# Patient Record
Sex: MALE | Race: WHITE | NOT HISPANIC OR LATINO | Employment: FULL TIME | URBAN - METROPOLITAN AREA
[De-identification: names, ages, dates, MRNs, and addresses within clinical notes are randomized per-mention and may not be internally consistent; named-entity substitution may affect disease eponyms.]

---

## 2017-04-13 ENCOUNTER — TRANSFERRED RECORDS (OUTPATIENT)
Dept: HEALTH INFORMATION MANAGEMENT | Facility: CLINIC | Age: 47
End: 2017-04-13

## 2017-04-18 DIAGNOSIS — G44.1 VASCULAR HEADACHE: ICD-10-CM

## 2017-04-19 RX ORDER — RIZATRIPTAN BENZOATE 10 MG/1
TABLET ORAL
Qty: 30 TABLET | Refills: 0 | Status: SHIPPED | OUTPATIENT
Start: 2017-04-19 | End: 2017-05-18

## 2017-04-19 NOTE — TELEPHONE ENCOUNTER
:;  Please notify patient that he is due soon for an office visit with Dr. Gerald Mckeon.  Last physical 5/11/16.

## 2017-05-11 DIAGNOSIS — E78.5 HYPERLIPIDEMIA LDL GOAL <130: ICD-10-CM

## 2017-05-11 LAB
CHOLEST SERPL-MCNC: 226 MG/DL
HDLC SERPL-MCNC: 44 MG/DL
LDLC SERPL CALC-MCNC: 158 MG/DL
NONHDLC SERPL-MCNC: 182 MG/DL
TRIGL SERPL-MCNC: 121 MG/DL

## 2017-05-11 PROCEDURE — 80061 LIPID PANEL: CPT | Performed by: FAMILY MEDICINE

## 2017-05-11 PROCEDURE — 36415 COLL VENOUS BLD VENIPUNCTURE: CPT | Performed by: FAMILY MEDICINE

## 2017-05-18 ENCOUNTER — OFFICE VISIT (OUTPATIENT)
Dept: FAMILY MEDICINE | Facility: CLINIC | Age: 47
End: 2017-05-18
Payer: COMMERCIAL

## 2017-05-18 ENCOUNTER — RADIANT APPOINTMENT (OUTPATIENT)
Dept: GENERAL RADIOLOGY | Facility: CLINIC | Age: 47
End: 2017-05-18
Attending: FAMILY MEDICINE
Payer: COMMERCIAL

## 2017-05-18 VITALS
SYSTOLIC BLOOD PRESSURE: 119 MMHG | WEIGHT: 214.4 LBS | HEART RATE: 66 BPM | BODY MASS INDEX: 31.66 KG/M2 | TEMPERATURE: 98 F | DIASTOLIC BLOOD PRESSURE: 86 MMHG

## 2017-05-18 DIAGNOSIS — M25.571 CHRONIC PAIN OF RIGHT ANKLE: ICD-10-CM

## 2017-05-18 DIAGNOSIS — Z00.00 ROUTINE GENERAL MEDICAL EXAMINATION AT A HEALTH CARE FACILITY: Primary | ICD-10-CM

## 2017-05-18 DIAGNOSIS — G44.1 VASCULAR HEADACHE: ICD-10-CM

## 2017-05-18 DIAGNOSIS — G89.29 CHRONIC PAIN OF RIGHT ANKLE: ICD-10-CM

## 2017-05-18 PROCEDURE — 73610 X-RAY EXAM OF ANKLE: CPT | Mod: RT

## 2017-05-18 PROCEDURE — 99396 PREV VISIT EST AGE 40-64: CPT | Performed by: FAMILY MEDICINE

## 2017-05-18 RX ORDER — RIZATRIPTAN BENZOATE 10 MG/1
TABLET ORAL
Qty: 30 TABLET | Refills: 3 | Status: SHIPPED | OUTPATIENT
Start: 2017-05-18 | End: 2019-04-15

## 2017-05-18 NOTE — NURSING NOTE
"Chief Complaint   Patient presents with     Physical       Initial /86  Pulse 66  Temp 98  F (36.7  C) (Oral)  Wt 214 lb 6.4 oz (97.3 kg)  BMI 31.66 kg/m2 Estimated body mass index is 31.66 kg/(m^2) as calculated from the following:    Height as of 5/11/16: 5' 9\" (1.753 m).    Weight as of this encounter: 214 lb 6.4 oz (97.3 kg).  Medication Reconciliation: complete  Titus Ocampo CMA    "

## 2017-05-18 NOTE — PROGRESS NOTES
SUBJECTIVE:     CC: Marcell Rodriguez is an 46 year old male who presents for preventative health visit.     Healthy Habits:    Do you get at least three servings of calcium containing foods daily (dairy, green leafy vegetables, etc.)? yes    Amount of exercise or daily activities, outside of work: 2-3 day(s) per week    Problems taking medications regularly No    Medication side effects: No    Have you had an eye exam in the past two years? no    Do you see a dentist twice per year? yes    Do you have sleep apnea, excessive snoring or daytime drowsiness?no    Colonoscopy done on this date: 2015 (approximately), by this group: [facility], results were normal.         Pain in right ankle, IBS symptoms   SUBJECTIVE:  Marcell Rodriguez is a 46 year old male who presents today for right ankle pain  occurred 6 months ago.    The mechanism of injury includes: None  Quality: Sharp  What makes it worse: walking and standing  What makes it better:resting  Associated Signs/ Symptoms: no swelling popping or cracking  Treatment: ibuprofen no helpHistory of recurrent ankle injuries:    Symptoms are Stable  Denies any numbness/tingling.    Past Medical, social, family histories, medications, and allergies reviewed and updated    OBJECTIVE:  Blood pressure 119/86, pulse 66, temperature 98  F (36.7  C), temperature source Oral, weight 214 lb 6.4 oz (97.3 kg).  Patient is alert and in no apparent distress distress  Ankle Exam (right):  Inspection:no swelling seen  Palpation:tender anterolateral aspect of right ankle  X-Ray:   Possible old chip fracture medial malleolus     Today's PHQ-2 Score:   PHQ-2 ( 1999 [employer]) 5/18/2017 5/11/2016   Q1: Little interest or pleasure in doing things 0 0   Q2: Feeling down, depressed or hopeless 0 0   PHQ-2 Score 0 0   Little interest or pleasure in doing things - -   Feeling down, depressed or hopeless - -   PHQ-2 Score - -       Abuse: Current or Past(Physical, Sexual or Emotional)- No  Do you feel  safe in your environment - Yes    Social History   Substance Use Topics     Smoking status: Never Smoker     Smokeless tobacco: Never Used      Comment: Lives with a smoker, smokes outside     Alcohol use No     The patient does not drink >3 drinks per day nor >7 drinks per week.    Last PSA: No results found for: PSA    Recent Labs   Lab Test  05/11/17   0703  04/22/16   0729  11/20/14   0750  08/13/13   0728   CHOL  226*  199  161  227*   HDL  44  49  52  52   LDL  158*  126*  88  155*   TRIG  121  122  105  99   CHOLHDLRATIO   --    --   3.1  4.4   NHDL  182*  150*   --    --        Reviewed orders with patient. Reviewed health maintenance and updated orders accordingly - Yes    Reviewed and updated as needed this visit by clinical staff  Tobacco  Allergies  Med Hx  Surg Hx  Fam Hx  Soc Hx        Reviewed and updated as needed this visit by Provider            ROS:  C: NEGATIVE for fever, chills, change in weight  I: NEGATIVE for worrisome rashes, moles or lesions  E: NEGATIVE for vision changes or irritation  ENT: NEGATIVE for ear, mouth and throat problems  R: NEGATIVE for significant cough or SOB  CV: NEGATIVE for chest pain, palpitations or peripheral edema  GI: NEGATIVE for nausea, abdominal pain, heartburn, or change in bowel habits   male: negative for dysuria, hematuria, decreased urinary stream, erectile dysfunction, urethral discharge  M: NEGATIVE for significant arthralgias or myalgia  N: NEGATIVE for weakness, dizziness or paresthesias  P: NEGATIVE for changes in mood or affect    Problem list, Medication list, Allergies, and Medical/Social/Surgical histories reviewed in [facility] and updated as appropriate.  OBJECTIVE:     /86  Pulse 66  Temp 98  F (36.7  C) (Oral)  Wt 214 lb 6.4 oz (97.3 kg)  BMI 31.66 kg/m2  EXAM:  GENERAL: healthy, alert and no distress  EYES: Eyes grossly normal to inspection, PERRL and conjunctivae and sclerae normal  HENT: ear canals and TM's normal, nose and mouth  "without ulcers or lesions  NECK: no adenopathy, no asymmetry, masses, or scars and thyroid normal to palpation  RESP: lungs clear to auscultation - no rales, rhonchi or wheezes  CV: regular rate and rhythm, normal S1 S2, no S3 or S4, no murmur, click or rub, no peripheral edema and peripheral pulses strong  ABDOMEN: soft, nontender, no hepatosplenomegaly, no masses and bowel sounds normal  MS: no gross musculoskeletal defects noted, no edema  SKIN: no suspicious lesions or rashes  NEURO: Normal strength and tone, mentation intact and speech normal  PSYCH: mentation appears normal, affect normal/bright    ASSESSMENT/PLAN:         ICD-10-CM    1. Routine general medical examination at a health care facility Z00.00    2. Vascular headaches G44.1 rizatriptan (MAXALT) 10 MG tablet       COUNSELING:  Reviewed preventive health counseling, as reflected in patient instructions       Regular exercise       Healthy diet/nutrition         reports that he has never smoked. He has never used smokeless tobacco.    Estimated body mass index is 31.66 kg/(m^2) as calculated from the following:    Height as of 5/11/16: 5' 9\" (1.753 m).    Weight as of this encounter: 214 lb 6.4 oz (97.3 kg).   Weight management plan: less calories and work out    Counseling Resources:  ATP IV Guidelines  Pooled Cohorts Equation Calculator  FRAX Risk Assessment  ICSI Preventive Guidelines  Dietary Guidelines for Americans, 2010  USDA's MyPlate  ASA Prophylaxis  Lung CA Screening    Gerald Mckeon MD  [facility]  "

## 2017-05-18 NOTE — MR AVS SNAPSHOT
After Visit Summary   5/18/2017    Marcell Rodriguez    MRN: [id number]           Patient Information     Date Of Birth          1970        Visit Information        Provider Department      5/18/2017 8:00 AM Gerald Mckeon MD [facility]        Today's Diagnoses     Routine general medical examination at a health care facility    -  1    Vascular headaches        Chronic pain of right ankle          Care Instructions      Preventive Health Recommendations  Male Ages 40 to 49    Yearly exam:             See your health care provider every year in order to  o   Review health changes.   o   Discuss preventive care.    o   Review your medicines if your doctor has prescribed any.    You should be tested each year for STDs (sexually transmitted diseases) if you re at risk.     Have a cholesterol test every 5 years.     Have a colonoscopy (test for colon cancer) if someone in your family has had colon cancer or polyps before age 50.     After age 45, have a diabetes test (fasting glucose). If you are at risk for diabetes, you should have this test every 3 years.      Talk with your health care provider about whether or not a prostate cancer screening test (PSA) is right for you.    Shots: Get a flu shot each year. Get a tetanus shot every 10 years.     Nutrition:    Eat at least 5 servings of fruits and vegetables daily.     Eat whole-grain bread, whole-wheat pasta and brown rice instead of white grains and rice.     Talk to your provider about Calcium and Vitamin D.     Lifestyle    Exercise for at least 150 minutes a week (30 minutes a day, 5 days a week). This will help you control your weight and prevent disease.     Limit alcohol to one drink per day.     No smoking.     Wear sunscreen to prevent skin cancer.     See your dentist every six months for an exam and cleaning.            Follow-ups after your visit        Who to contact     If you have questions or need follow up  information about today's clinic visit or your schedule please contact [facility] directly at [phone].  Normal or non-critical lab and imaging results will be communicated to you by MyChart, letter or phone within 4 business days after the clinic has received the results. If you do not hear from us within 7 days, please contact the clinic through [email or web address] or phone. If you have a critical or abnormal lab result, we will notify you by phone as soon as possible.  Submit refill requests through [employer] or call your pharmacy and they will forward the refill request to us. Please allow 3 business days for your refill to be completed.          Additional Information About Your Visit        MyChart Information     [employer] gives you secure access to your electronic health record. If you see a primary care provider, you can also send messages to your care team and make appointments. If you have questions, please call your primary care clinic.  If you do not have a primary care provider, please call [phone] and they will assist you.        Care EveryWhere ID     This is your Care EveryWhere ID. This could be used by other organizations to access your [facility] medical records  [id number]        Your Vitals Were     Pulse Temperature BMI (Body Mass Index)             66 98  F (36.7  C) (Oral) 31.66 kg/m2          Blood Pressure from Last 3 Encounters:   05/18/17 119/86   05/11/16 130/72   11/23/15 124/78    Weight from Last 3 Encounters:   05/18/17 214 lb 6.4 oz (97.3 kg)   05/11/16 204 lb (92.5 kg)   11/23/15 202 lb 9.6 oz (91.9 kg)                 Today's Medication Changes          These changes are accurate as of: 5/18/17  8:08 AM.  If you have any questions, ask your nurse or doctor.               These medicines have changed or have updated prescriptions.        Dose/Directions    rizatriptan 10 MG tablet   Commonly known as:  MAXALT   This may have changed:  See the new instructions.   Used for:   Vascular headache   Changed by:  Gerald Mckeon MD        TAKE ONE TABLET BY MOUTH AT ONSET OF HEADACHE FOR MIGRAINE, MAY REPEAT DOSE AFTER 2 HOURS IF NEEDED.   Quantity:  30 tablet   Refills:  3            Where to get your medicines      These medications were sent to [place], [place] - [address]  [address], [address], [facility] [address]     Phone:  [phone]     rizatriptan 10 MG tablet                Primary Care Provider Office Phone # Fax #    Gerald Mckeon [phone]       [facility] [address]  [place] [address]        Thank you!     Thank you for choosing [facility]  for your care. Our goal is always to provide you with excellent care. Hearing back from our patients is one way we can continue to improve our services. Please take a few minutes to complete the written survey that you may receive in the mail after your visit with us. Thank you!             Your Updated Medication List - Protect others around you: Learn how to safely use, store and throw away your medicines at www.disposemymeds.org.          This list is accurate as of: 5/18/17  8:08 AM.  Always use your most recent med list.                   Brand Name Dispense Instructions for use    Multi-vitamin Tabs tablet      Take 1 tablet by mouth daily       priLOSEC 20 MG CR capsule   Generic drug:  omeprazole      1 CAPSULE DAILY       rizatriptan 10 MG tablet    MAXALT    30 tablet    TAKE ONE TABLET BY MOUTH AT ONSET OF HEADACHE FOR MIGRAINE, MAY REPEAT DOSE AFTER 2 HOURS IF NEEDED.       * tadalafil 5 MG tablet    CIALIS    30 tablet    Take 1 tablet (5 mg) by mouth daily Never use with nitroglycerin, terazosin or doxazosin.       * tadalafil 20 MG tablet    CIALIS    10 tablet    Take 0.5-1 tablets (10-20 mg) by mouth daily as needed for erectile dysfunction Never use with nitroglycerin, terazosin or doxazosin.       VIBERZI  100 MG tablet   Generic drug:  eluxadoline      Take 100 mg by mouth 2 times daily (with meals)       * Notice:  This list has 2 medication(s) that are the same as other medications prescribed for you. Read the directions carefully, and ask your doctor or other care provider to review them with you.

## 2017-05-23 NOTE — PROGRESS NOTES
"SUBJECTIVE:  Marcell Rodriguez is a 46 year old male who is seen in consultation at the request of Dr. Mckeon for right ankle pain that started 1 year ago. He was in the army for 25 years.   Cause: unknown     Present symptoms: burning, pain medially. Symptoms worsen with more extensive walking.   No swelling.   No numbness.     Previous treatment: Home ankle exercises, Naproxen which does not help.     Prior history of related problems: Inversion injury to the ankle many years ago (0830-3124). As an infant he noticed that the right foot turns externally.     Patients past medical, surgical, social and family histories reviewed.      Orthopedic PMH: Back surgery (9656-3663), Right knee arthroscopy (2013)    Past Medical History:   Diagnosis Date     Arthritis 1999    Neck     High cholesterol      Hyperlipidemia      Other general counseling and advice for contraceptive management      Shoulder strain      Vascular headaches      Vascular headaches      Vascular headaches      Vascular headaches       Past Surgical History:   Procedure Laterality Date     ABDOMEN SURGERY  11/9/2015    CT Scan, found 1.6 CM homogeneous cyst on kidney     BACK SURGERY  3190-0270    Neck injuries     BIOPSY       COLONOSCOPY  11/14/2015    [facility], no abnormalities     ENT SURGERY       GI SURGERY  2012, 10/2015    Gluten intolerant, IBD like symptoms     HEAD & NECK SURGERY  9065-3571    Neck injuries     ORTHOPEDIC SURGERY  2013    Knee     VASECTOMY        REVIEW OF SYSTEMS:  CONSTITUTIONAL:NEGATIVE for fever, chills, change in weight  INTEGUMENTARY/SKIN: NEGATIVE for worrisome rashes, moles or lesions  MUSCULOSKELETAL:See HPI above  NEURO: NEGATIVE for weakness, dizziness or paresthesias    /83 (BP Location: Right arm, Patient Position: Chair, Cuff Size: Adult Regular)  Pulse 61  Ht 1.753 m (5' 9\")  Wt 95 kg (209 lb 6.4 oz)  SpO2 96%  BMI 30.92 kg/m2    EXAM:  CONSTITUTIONAL:  NEGATIVE for fever, chills, change in " weight  INTEGUMENTARY/SKIN:  NEGATIVE for worrisome rashes, moles or lesions  EYES:  NEGATIVE for vision changes or irritation  ENT/MOUTH:  NEGATIVE for ear, mouth and throat problems  RESP:  NEGATIVE for significant cough or SOB  BREAST:  NEGATIVE for masses, tenderness or discharge  CV:  NEGATIVE for chest pain, palpitations or peripheral edema  GI:  NEGATIVE for nausea, abdominal pain, heartburn, or change in bowel habits  :  Negative   MUSCULOSKELETAL:  See HPI above  NEURO:  NEGATIVE for weakness, dizziness or paresthesias  ENDOCRINE:  NEGATIVE for temperature intolerance, skin/hair changes  HEME/ALLERGY/IMMUNE:  NEGATIVE for bleeding problems  PSYCHIATRIC:  NEGATIVE for changes in mood or affect        MUSCULOSKELETAL:  RIGHT ANKLE  Inspection: No fullness, swelling, or nodularity of the posterior tibial tendon  Stance: pes planus bilaterally.  The right leg/foot does externally rotate more than the left.  Both heels invert appropriately with toe standing   Tenderness: mildly over the posterior tibial tendon and the point of the medial malleolus, mostly over the anterior aspect of medial malleolus, all the way up posterior medial aspect of the tibia but is present on both legs.   Range of Motion: good  Inversion: painful more anterior to medial malleolus than posterior  Passive forced eversion: minimal pain  Passive forced plantarflexion: minimal pain  Sensation: intact   Slight varicose vein    X-RAY INTERPRETATION: Obtained 5/18/17 of the RIGHT ANKLE: 3-views, reviewed in the office with the patient by myself today and essentially normal with a small ossicle off the medial side. The subtalar joint looks good. There is some very mild spurring of the talonavicular joint.     ASSESSMENT:   1. I believe his pain may be coming from varicosity/phlebitis of the saphenous vein and some accompanying saphenous neuritis potentially   2. Pes planus with some posterior tibial tendon tendinitis, mild degree  3. Pes  alfred.    PLAN:   I discussed the findings and diagnosis with the patient. We talked about the options: orthotics, physical therapy, nsaids. We also talked about further imaging, i.e. MRI of the ankle, which I don't think would be particularly helpful.. All questions were answered. Patient declined orthotics today. He should avoid wearing tight shoes/socks around the ankle to avoid irritation of saphenous nerve. He is a former barefoot-equivalent runner.  We also talked about seeing a vascular specialist regarding.  Information for vascular specialist provided today.     Return to the clinic PRN.     SUMIT Cam MD  Dept. Orthopedic Surgery  [facility]    This document serves as a record of the services and decisions personally performed and made by Dr. SUMIT Cam MD. It was created on his behalf by Hari Isaac, a trained medical scribe. The creation of this record is based on the provider's personal observations and the statements of the patient. This document has been checked and approved by the attending provider.   Hari Isaac May 25, 2017 8:51 AM

## 2017-05-25 ENCOUNTER — OFFICE VISIT (OUTPATIENT)
Dept: ORTHOPEDICS | Facility: CLINIC | Age: 47
End: 2017-05-25
Payer: COMMERCIAL

## 2017-05-25 VITALS
SYSTOLIC BLOOD PRESSURE: 119 MMHG | HEART RATE: 61 BPM | BODY MASS INDEX: 31.01 KG/M2 | HEIGHT: 69 IN | WEIGHT: 209.4 LBS | DIASTOLIC BLOOD PRESSURE: 83 MMHG | OXYGEN SATURATION: 96 %

## 2017-05-25 DIAGNOSIS — M21.41 PES PLANUS OF BOTH FEET: ICD-10-CM

## 2017-05-25 DIAGNOSIS — M76.821 POSTERIOR TIBIAL TENDINITIS, RIGHT: ICD-10-CM

## 2017-05-25 DIAGNOSIS — M21.42 PES PLANUS OF BOTH FEET: ICD-10-CM

## 2017-05-25 DIAGNOSIS — G57.81 SAPHENOUS NEURITIS, RIGHT: Primary | ICD-10-CM

## 2017-05-25 PROCEDURE — 99203 OFFICE O/P NEW LOW 30 MIN: CPT | Performed by: ORTHOPAEDIC SURGERY

## 2017-05-25 ASSESSMENT — PAIN SCALES - GENERAL: PAINLEVEL: MILD PAIN (3)

## 2017-05-25 NOTE — NURSING NOTE
"Chief Complaint   Patient presents with     Musculoskeletal Problem     Right ankle pain. Onset: 6 months.       Initial /83 (BP Location: Right arm, Patient Position: Chair, Cuff Size: Adult Regular)  Pulse 61  Ht 1.753 m (5' 9\")  Wt 95 kg (209 lb 6.4 oz)  SpO2 96%  BMI 30.92 kg/m2 Estimated body mass index is 30.92 kg/(m^2) as calculated from the following:    Height as of this encounter: 1.753 m (5' 9\").    Weight as of this encounter: 95 kg (209 lb 6.4 oz).  Medication Reconciliation: complete   Ana Paula Cortes LPN      "

## 2017-05-25 NOTE — LETTER
5/25/2017       RE: Marcell Rodriguez  [address]  [place] [address]           Dear Colleague,    Thank you for referring your patient, Marcell Rodriguez, to the [facility]. Please see a copy of my visit note below.    SUBJECTIVE:  Marcell Rodriguez is a 46 year old male who is seen in consultation at the request of Dr. Mckeon for right ankle pain that started 1 year ago. He was in the army for 25 years.   Cause: unknown     Present symptoms: burning, pain medially. Symptoms worsen with more extensive walking.   No swelling.   No numbness.     Previous treatment: Home ankle exercises, Naproxen which does not help.     Prior history of related problems: Inversion injury to the ankle many years ago (5948-3138). As an infant he noticed that the right foot turns externally.     Patients past medical, surgical, social and family histories reviewed.      Orthopedic PMH: Back surgery ([id number]), Right knee arthroscopy (2013)    Past Medical History:   Diagnosis Date     Arthritis 1999    Neck     High cholesterol      Hyperlipidemia      Other general counseling and advice for contraceptive management      Shoulder strain      Vascular headaches      Vascular headaches      Vascular headaches      Vascular headaches       Past Surgical History:   Procedure Laterality Date     ABDOMEN SURGERY  11/9/2015    CT Scan, found 1.6 CM homogeneous cyst on kidney     BACK SURGERY  9269-8735    Neck injuries     BIOPSY       COLONOSCOPY  11/14/2015    [facility], no abnormalities     ENT SURGERY       GI SURGERY  2012, 10/2015    Gluten intolerant, IBD like symptoms     HEAD & NECK SURGERY  3818-9522    Neck injuries     ORTHOPEDIC SURGERY  2013    Knee     VASECTOMY        REVIEW OF SYSTEMS:  CONSTITUTIONAL:NEGATIVE for fever, chills, change in weight  INTEGUMENTARY/SKIN: NEGATIVE for worrisome rashes, moles or lesions  MUSCULOSKELETAL:See HPI above  NEURO: NEGATIVE for weakness, dizziness or  "paresthesias    /83 (BP Location: Right arm, Patient Position: Chair, Cuff Size: Adult Regular)  Pulse 61  Ht 1.753 m (5' 9\")  Wt 95 kg (209 lb 6.4 oz)  SpO2 96%  BMI 30.92 kg/m2    EXAM:  CONSTITUTIONAL:  NEGATIVE for fever, chills, change in weight  INTEGUMENTARY/SKIN:  NEGATIVE for worrisome rashes, moles or lesions  EYES:  NEGATIVE for vision changes or irritation  ENT/MOUTH:  NEGATIVE for ear, mouth and throat problems  RESP:  NEGATIVE for significant cough or SOB  BREAST:  NEGATIVE for masses, tenderness or discharge  CV:  NEGATIVE for chest pain, palpitations or peripheral edema  GI:  NEGATIVE for nausea, abdominal pain, heartburn, or change in bowel habits  :  Negative   MUSCULOSKELETAL:  See HPI above  NEURO:  NEGATIVE for weakness, dizziness or paresthesias  ENDOCRINE:  NEGATIVE for temperature intolerance, skin/hair changes  HEME/ALLERGY/IMMUNE:  NEGATIVE for bleeding problems  PSYCHIATRIC:  NEGATIVE for changes in mood or affect        MUSCULOSKELETAL:  RIGHT ANKLE  Inspection: No fullness, swelling, or nodularity of the posterior tibial tendon  Stance: pes planus bilaterally.  The right leg/foot does externally rotate more than the left.  Both heels invert appropriately with toe standing   Tenderness: mildly over the posterior tibial tendon and the point of the medial malleolus, mostly over the anterior aspect of medial malleolus, all the way up posterior medial aspect of the tibia but is present on both legs.   Range of Motion: good  Inversion: painful more anterior to medial malleolus than posterior  Passive forced eversion: minimal pain  Passive forced plantarflexion: minimal pain  Sensation: intact   Slight varicose vein    X-RAY INTERPRETATION: Obtained 5/18/17 of the RIGHT ANKLE: 3-views, reviewed in the office with the patient by myself today and essentially normal with a small ossicle off the medial side. The subtalar joint looks good. There is some very mild spurring of the " talonavicular joint.     ASSESSMENT:   1. I believe his pain may be coming from varicosity/phlebitis of the saphenous vein and some accompanying saphenous neuritis potentially   2. Pes planus with some posterior tibial tendon tendinitis, mild degree  3. Pes planus.    PLAN:   I discussed the findings and diagnosis with the patient. We talked about the options: orthotics, physical therapy, nsaids. We also talked about further imaging, i.e. MRI of the ankle, which I don't think would be particularly helpful.. All questions were answered. Patient declined orthotics today. He should avoid wearing tight shoes/socks around the ankle to avoid irritation of saphenous nerve. He is a former barefoot-equivalent runner.  We also talked about seeing a vascular specialist regarding.  Information for vascular specialist provided today.     Return to the clinic PRN.     SUMIT Cam MD  Dept. Orthopedic Surgery  [facility]    This document serves as a record of the services and decisions personally performed and made by Dr. SUMIT Cam MD. It was created on his behalf by Hari Isaac, a trained medical scribe. The creation of this record is based on the provider's personal observations and the statements of the patient. This document has been checked and approved by the attending provider.   Hari Isaac May 25, 2017 8:51 AM    Again, thank you for allowing me to participate in the care of your patient.        Sincerely,              Bk Cam MD

## 2017-05-25 NOTE — PATIENT INSTRUCTIONS
Please remember to call and schedule a follow up appointment if one was recommended at your earliest convenience.   Orthopedics CLINIC HOURS TELEPHONE NUMBER   Bk Cam M.D.      Ana Paula Cortes,  LPN Tuesday 8 am -5 pm    1st & 3rd Wednesday  1-4pm [place] 2nd & 4th Wednesday  8 am -11 pm / [place]  1-4pm / [place] Thursday 8 am -5 pm   Specialty schedulers:   [phone] to make an appointment with any Specialty Provider.   Main Clinic:   [phone] to make an appointment with your primary provider   Urgent Care locations:    [facility] Monday-Friday 5 pm - 9 pm  Saturday-Sunday 9 am -5pm      Monday-Friday 11 am - 9 pm  Saturday 9 am - 5 pm [phone]     If SURGERY has been recommended, please call our Specialty Schedulers at [phone] to schedule your procedure.    If you need a medication refill, please contact your pharmacy. Please allow 3 business days for your refill to be completed.  Use [employer] (secure e-mail communication and access to your chart) to send a message or to make an appointment. Please ask how you can sign up for [employer].

## 2017-05-25 NOTE — MR AVS SNAPSHOT
After Visit Summary   5/25/2017    Marcell Rodriguez    MRN: [id number]           Patient Information     Date Of Birth          1970        Visit Information        Provider Department      5/25/2017 8:00 AM Bk Cam MD [facility]        Today's Diagnoses     Saphenous neuritis, right    -  1    Pes planus of both feet        Posterior tibial tendinitis, right          Care Instructions    Please remember to call and schedule a follow up appointment if one was recommended at your earliest convenience.   Orthopedics CLINIC HOURS TELEPHONE NUMBER   Bk Cam M.D.      Ana Paula Cortes,  LPN Tuesday 8 am -5 pm    1st & 3rd Wednesday  1-4pm [place] 2nd & 4th Wednesday  8 am -11 pm / [place]  1-4pm / [place] Thursday 8 am -5 pm   Specialty schedulers:   [phone] to make an appointment with any Specialty Provider.   Main Clinic:   [phone] to make an appointment with your primary provider   Urgent Care locations:    [facility] Monday-Friday 5 pm - 9 pm  Saturday-Sunday 9 am -5pm      Monday-Friday 11 am - 9 pm  Saturday 9 am - 5 pm [phone]     If SURGERY has been recommended, please call our Specialty Schedulers at [phone] to schedule your procedure.    If you need a medication refill, please contact your pharmacy. Please allow 3 business days for your refill to be completed.  Use [employer] (secure e-mail communication and access to your chart) to send a message or to make an appointment. Please ask how you can sign up for [employer].            Follow-ups after your visit        Who to contact     If you have questions or need follow up information about today's clinic visit or your schedule please contact [facility] directly at [phone].  Normal or non-critical lab and imaging results will be communicated to you by MyChart, letter or phone within 4 business days after the clinic has  "received the results. If you do not hear from us within 7 days, please contact the clinic through [employer] or phone. If you have a critical or abnormal lab result, we will notify you by phone as soon as possible.  Submit refill requests through [employer] or call your pharmacy and they will forward the refill request to us. Please allow 3 business days for your refill to be completed.          Additional Information About Your Visit        [employer] Information     [employer] gives you secure access to your electronic health record. If you see a primary care provider, you can also send messages to your care team and make appointments. If you have questions, please call your primary care clinic.  If you do not have a primary care provider, please call [phone] and they will assist you.        Care EveryWhere ID     This is your Care EveryWhere ID. This could be used by other organizations to access your [place] medical records  [id number]        Your Vitals Were     Pulse Height Pulse Oximetry BMI (Body Mass Index)          61 1.753 m (5' 9\") 96% 30.92 kg/m2         Blood Pressure from Last 3 Encounters:   05/25/17 119/83   05/18/17 119/86   05/11/16 130/72    Weight from Last 3 Encounters:   05/25/17 95 kg (209 lb 6.4 oz)   05/18/17 97.3 kg (214 lb 6.4 oz)   05/11/16 92.5 kg (204 lb)              Today, you had the following     No orders found for display         Today's Medication Changes          These changes are accurate as of: 5/25/17  9:40 AM.  If you have any questions, ask your nurse or doctor.               Stop taking these medicines if you haven't already. Please contact your care team if you have questions.     tadalafil 20 MG tablet   Commonly known as:  CIALIS   Stopped by:  Bk Cam MD           tadalafil 5 MG tablet   Commonly known as:  CIALIS   Stopped by:  Bk Cam MD                    Primary Care Provider Office Phone # Fax #    Gerald Mckeon [phone] " [phone]       [facility] [address] [facility] [address]        Thank you!     Thank you for choosing [facility]  for your care. Our goal is always to provide you with excellent care. Hearing back from our patients is one way we can continue to improve our services. Please take a few minutes to complete the written survey that you may receive in the mail after your visit with us. Thank you!             Your Updated Medication List - Protect others around you: Learn how to safely use, store and throw away your medicines at www.disposemymeds.org.          This list is accurate as of: 5/25/17  9:40 AM.  Always use your most recent med list.                   Brand Name Dispense Instructions for use    Multi-vitamin Tabs tablet      Take 1 tablet by mouth daily       priLOSEC 20 MG CR capsule   Generic drug:  omeprazole      1 CAPSULE DAILY       rizatriptan 10 MG tablet    MAXALT    30 tablet    TAKE ONE TABLET BY MOUTH AT ONSET OF HEADACHE FOR MIGRAINE, MAY REPEAT DOSE AFTER 2 HOURS IF NEEDED.       VIBERZI 100 MG tablet   Generic drug:  eluxadoline      Take 100 mg by mouth 2 times daily (with meals)

## 2017-06-06 ENCOUNTER — OFFICE VISIT (OUTPATIENT)
Dept: SURGERY | Facility: CLINIC | Age: 47
End: 2017-06-06
Payer: COMMERCIAL

## 2017-06-06 VITALS
SYSTOLIC BLOOD PRESSURE: 119 MMHG | BODY MASS INDEX: 31.55 KG/M2 | HEART RATE: 60 BPM | WEIGHT: 213 LBS | HEIGHT: 69 IN | DIASTOLIC BLOOD PRESSURE: 78 MMHG

## 2017-06-06 DIAGNOSIS — I83.811 VARICOSE VEINS OF RIGHT LOWER EXTREMITIES WITH PAIN: Primary | ICD-10-CM

## 2017-06-06 PROCEDURE — 99203 OFFICE O/P NEW LOW 30 MIN: CPT | Performed by: SURGERY

## 2017-06-06 NOTE — PROGRESS NOTES
"Dear Dr. Mckeon, Gerald Edwards  I have seen Marcell Rodriguez and as you know his chief complaint is left leg painful varicose veins.    With walking or standing has pain where the greater saphenous vein at or just below the medial malleolus.  The vein sticks out and has been seen by ortho and they do not see any other cause.      HPI:  Patient is a 46 year old .male  with complaints of leg pain where the varicose veins are.  It is described as a mild burn, like a burn in the area.     Patient has not worn compression stockings, has not deep vein thrombosis or trauma to the leg  Patient has family hx of varicose veins.  Mother had pulmonary embolus   Elevating legs does help symptoms.  Patient has not lower extremity edema.  Patient has needed pain medications for vein pain.  Patient has had veins interfere with activities of daily living   Patient has not venous stasis ulcers.  Patient has not had bleeding from veins  Review Of Systems  Cardiovascular: negative  Respiratory: No shortness of breath, dyspnea on exertion, cough, or hemoptysis  Endocrine: negative  /78  Pulse 60  Ht 5' 9\" (1.753 m)  Wt 213 lb (96.6 kg)  BMI 31.45 kg/m2    Past Medical History:   Diagnosis Date     Arthritis 1999    Neck     High cholesterol      Hyperlipidemia      Other general counseling and advice for contraceptive management      Shoulder strain      Vascular headaches      Vascular headaches      Vascular headaches      Vascular headaches         Past Surgical History:   Procedure Laterality Date     ABDOMEN SURGERY  11/9/2015    CT Scan, found 1.6 CM homogeneous cyst on kidney     BACK SURGERY  2804-4566    Neck injuries     BIOPSY       COLONOSCOPY  11/14/2015    [facility], no abnormalities     ENT SURGERY       GI SURGERY  2012, 10/2015    Gluten intolerant, IBD like symptoms     HEAD & NECK SURGERY  2460-0923    Neck injuries     ORTHOPEDIC SURGERY  2013    Knee     VASECTOMY         Social History     Social History "     Marital status:      Spouse name: N/A     Number of children: N/A     Years of education: N/A     Occupational History      [employer]     Social History Main Topics     Smoking status: Never Smoker     Smokeless tobacco: Never Used      Comment: Lives with a smoker, smokes outside     Alcohol use No     Drug use: No     Sexual activity: Yes     Partners: Female     Birth control/ protection: Male Surgical, Female Surgical     Other Topics Concern     Parent/Sibling W/ Cabg, Mi Or Angioplasty Before 65f 55m? No     Mother had clots from leg enter heart.     Social History Narrative       Current Outpatient Prescriptions   Medication Sig Dispense Refill     eluxadoline (VIBERZI) 100 MG tablet Take 100 mg by mouth 2 times daily (with meals)       rizatriptan (MAXALT) 10 MG tablet TAKE ONE TABLET BY MOUTH AT ONSET OF HEADACHE FOR MIGRAINE, MAY REPEAT DOSE AFTER 2 HOURS IF NEEDED. 30 tablet 3     multivitamin, therapeutic with minerals (MULTI-VITAMIN) TABS Take 1 tablet by mouth daily       PRILOSEC 20 MG PO CPDR 1 CAPSULE DAILY          Physical exam:  Patient able to get up on table without difficulty.  Head eyes, nose and mouth within normal limits.  No supraclavicular or cervical adenopathy palpated.  Thyroid within normal limits.  No carotid bruits auscultated.  Lungs are clear to auscultation  Heart is regular rate and rhythm with no murmur or thrills noted.  No costal vertebral angle tenderness noted.  Abdomen is abdomen is soft without significant tenderness, masses, organomegaly or guarding  bowel sounds are positive and no caput medusa noted.    Easily palpable posterior tibial pulse or dorsalis pedis pulse bilaterally.  Lower extremity edema is not present.    Points to the greater saphenous vein just at or below the medial malleolus.     Using the vein light on the right leg there are no obvious veins until get to the ankle and even there not much. But is tender right at the vein at the medial  malleolus.     Assessment is painful right leg varicose veins.  Plan is ultrasound of the right leg.  If the ultrasound shows that the right greater saphenous vein is incompetent then will need greater saphenous vein ligation and stripping and can remove secondary veins at time of surgery or with sclerotherapy.  Options were discussed including wearing of compression stockings, surgery and sclerotherapy.  Script for compression stockings was given.  Surgery with risks and complications were briefly discussed along with that of sclerotherapy.    Risks of surgery include damage to nerves, bleeding, infection, not getting all the veins and multiple punch biopsies over the veins that will drain over several days after surgery.  These punch hole usually do heal well but may leave some scarring.  Also discussed what to expect after surgery and when to follow up with me in clinic and to bring their compression stockings with them to clinic.    Risks of sclerotherapy include multiple episodes of the procedure, insurance may not cover it, bleeding and bruising.  Less likely risk include infection.    Please wear compression stockings and see if they help your discomfort.  Then when you come back to see me let me know.  You will need to bring these with you for sclerotherapy or for your post op visit after surgery.  I would suggest going to either the [employer] medical supply store next to [facility] or to [employer] ([phone]) on [address].  They will need to measure your legs to get the right fit.  If you go somewhere else and they do not measure your legs do not get them there.  It is important that you get the right size.  Please allow several days after you are measured to get your stockings.  They may not have your size in stock and will have to order them.    Please wear your compression stocking as some insurance companies will want to wear them for at least 3 months  before they even consider paying for your surgery or sclerotherapy.    CALL [phone]  to get the utrasound set up.        You must follow up with me in the clinic to go over your utrasound results.  I will not be calling you with the results.   It is very difficult to do this over  the phone.  I am not able to show you while looking at your leg what the next step should be.      Time spent with the patient with greater that 50% of the time in discussion was 31 minutes.  In discussing the veins and pain.  .      Samir Schmidt MD    `

## 2017-06-06 NOTE — NURSING NOTE
"Chief Complaint   Patient presents with     Consult     Right ankle vein issues       Initial /78  Pulse 60  Ht 5' 9\" (1.753 m)  Wt 213 lb (96.6 kg)  BMI 31.45 kg/m2 Estimated body mass index is 31.45 kg/(m^2) as calculated from the following:    Height as of this encounter: 5' 9\" (1.753 m).    Weight as of this encounter: 213 lb (96.6 kg).  Medication Reconciliation: complete   Fabiola Negron Cma      "

## 2017-06-06 NOTE — MR AVS SNAPSHOT
After Visit Summary   6/6/2017    Marcell Rodriguez    MRN: [id number]           Patient Information     Date Of Birth          1970        Visit Information        Provider Department      6/6/2017 9:00 AM Samir Schmidt MD [facility]        Today's Diagnoses     Varicose veins of right lower extremities with pain    -  1      Care Instructions    Points to the greater saphenous vein just at or below the medial malleolus.     Using the vein light on the right leg there are no obvious veins until get to the ankle and even there not much. But is tender right at the vein at the medial malleolus.     Assessment is painful right leg varicose veins.  Plan is ultrasound of the right leg.  If the ultrasound shows that the right greater saphenous vein is incompetent then will need greater saphenous vein ligation and stripping and can remove secondary veins at time of surgery or with sclerotherapy.  Options were discussed including wearing of compression stockings, surgery and sclerotherapy.  Script for compression stockings was given.  Surgery with risks and complications were briefly discussed along with that of sclerotherapy.    Risks of surgery include damage to nerves, bleeding, infection, not getting all the veins and multiple punch biopsies over the veins that will drain over several days after surgery.  These punch hole usually do heal well but may leave some scarring.  Also discussed what to expect after surgery and when to follow up with me in clinic and to bring their compression stockings with them to clinic.    Risks of sclerotherapy include multiple episodes of the procedure, insurance may not cover it, bleeding and bruising.  Less likely risk include infection.    Please wear compression stockings and see if they help your discomfort.  Then when you come back to see me let me know.  You will need to bring these with you for sclerotherapy or for your post op visit after  surgery.  I would suggest going to either the [employer] medical supply store next to [facility] or to [employer] ([phone]) on [address].  They will need to measure your legs to get the right fit.  If you go somewhere else and they do not measure your legs do not get them there.  It is important that you get the right size.  Please allow several days after you are measured to get your stockings.  They may not have your size in stock and will have to order them.    Please wear your compression stocking as some insurance companies will want to wear them for at least 3 months before they even consider paying for your surgery or sclerotherapy.    CALL [phone]  to get the utrasound set up.        You must follow up with me in the clinic to go over your utrasound results.  I will not be calling you with the results.   It is very difficult to do this over  the phone.  I am not able to show you while looking at your leg what the next step should be.              Follow-ups after your visit        Future tests that were ordered for you today     Open Future Orders        Priority Expected Expires Ordered    US Venous Competency Right Routine  6/6/2018 6/6/2017            Who to contact     If you have questions or need follow up information about today's clinic visit or your schedule please contact [facility] directly at [phone].  Normal or non-critical lab and imaging results will be communicated to you by MyChart, letter or phone within 4 business days after the clinic has received the results. If you do not hear from us within 7 days, please contact the clinic through MyChart or phone. If you have a critical or abnormal lab result, we will notify you by phone as soon as possible.  Submit refill requests through [employer] or call your pharmacy and they will forward the refill request to us. Please allow 3 business days for your refill to be completed.  "         Additional Information About Your Visit        MyChart Information     [employer] gives you secure access to your electronic health record. If you see a primary care provider, you can also send messages to your care team and make appointments. If you have questions, please call your primary care clinic.  If you do not have a primary care provider, please call [phone] and they will assist you.        Care EveryWhere ID     This is your Care EveryWhere ID. This could be used by other organizations to access your [place] medical records  [id number]        Your Vitals Were     Pulse Height BMI (Body Mass Index)             60 5' 9\" (1.753 m) 31.45 kg/m2          Blood Pressure from Last 3 Encounters:   06/06/17 119/78   05/25/17 119/83   05/18/17 119/86    Weight from Last 3 Encounters:   06/06/17 213 lb (96.6 kg)   05/25/17 209 lb 6.4 oz (95 kg)   05/18/17 214 lb 6.4 oz (97.3 kg)                 Today's Medication Changes          These changes are accurate as of: 6/6/17  9:28 AM.  If you have any questions, ask your nurse or doctor.               Start taking these medicines.        Dose/Directions    order for DME   Used for:  Varicose veins of right lower extremities with pain   Started by:  Samir Schmidt MD        15-20 mm mercury thigh high compression stockings 1-2 pairs   Quantity:  2 Package   Refills:  5            Where to get your medicines      Some of these will need a paper prescription and others can be bought over the counter.  Ask your nurse if you have questions.     Bring a paper prescription for each of these medications     order for DME                Primary Care Provider Office Phone # Fax #    Gerald Mckeon [phone]       [facility] [address] [facility] [address]        Thank you!     Thank you for choosing [facility]  for your care. Our goal is always to provide you with excellent care. Hearing back from our " patients is one way we can continue to improve our services. Please take a few minutes to complete the written survey that you may receive in the mail after your visit with us. Thank you!             Your Updated Medication List - Protect others around you: Learn how to safely use, store and throw away your medicines at www.disposemymeds.org.          This list is accurate as of: 6/6/17  9:28 AM.  Always use your most recent med list.                   Brand Name Dispense Instructions for use    Multi-vitamin Tabs tablet      Take 1 tablet by mouth daily       order for DME     2 Package    15-20 mm mercury thigh high compression stockings 1-2 pairs       priLOSEC 20 MG CR capsule   Generic drug:  omeprazole      1 CAPSULE DAILY       rizatriptan 10 MG tablet    MAXALT    30 tablet    TAKE ONE TABLET BY MOUTH AT ONSET OF HEADACHE FOR MIGRAINE, MAY REPEAT DOSE AFTER 2 HOURS IF NEEDED.       VIBERZI 100 MG tablet   Generic drug:  eluxadoline      Take 100 mg by mouth 2 times daily (with meals)

## 2017-06-06 NOTE — PATIENT INSTRUCTIONS
Points to the greater saphenous vein just at or below the medial malleolus.     Using the vein light on the right leg there are no obvious veins until get to the ankle and even there not much. But is tender right at the vein at the medial malleolus.     Assessment is painful right leg varicose veins.  Plan is ultrasound of the right leg.  If the ultrasound shows that the right greater saphenous vein is incompetent then will need greater saphenous vein ligation and stripping and can remove secondary veins at time of surgery or with sclerotherapy.  Options were discussed including wearing of compression stockings, surgery and sclerotherapy.  Script for compression stockings was given.  Surgery with risks and complications were briefly discussed along with that of sclerotherapy.    Risks of surgery include damage to nerves, bleeding, infection, not getting all the veins and multiple punch biopsies over the veins that will drain over several days after surgery.  These punch hole usually do heal well but may leave some scarring.  Also discussed what to expect after surgery and when to follow up with me in clinic and to bring their compression stockings with them to clinic.    Risks of sclerotherapy include multiple episodes of the procedure, insurance may not cover it, bleeding and bruising.  Less likely risk include infection.    Please wear compression stockings and see if they help your discomfort.  Then when you come back to see me let me know.  You will need to bring these with you for sclerotherapy or for your post op visit after surgery.  I would suggest going to either the [email or web address] medical supply store next to [facility] or to [email or web address] ([phone]) on [address].  They will need to measure your legs to get the right fit.  If you go somewhere else and they do not measure your legs do not get them there.  It is important that you get the right size.  Please allow  several days after you are measured to get your stockings.  They may not have your size in stock and will have to order them.    Please wear your compression stocking as some insurance companies will want to wear them for at least 3 months before they even consider paying for your surgery or sclerotherapy.    CALL [phone]  to get the utrasound set up.        You must follow up with me in the clinic to go over your utrasound results.  I will not be calling you with the results.   It is very difficult to do this over  the phone.  I am not able to show you while looking at your leg what the next step should be.

## 2017-06-19 ENCOUNTER — RADIANT APPOINTMENT (OUTPATIENT)
Dept: ULTRASOUND IMAGING | Facility: CLINIC | Age: 47
End: 2017-06-19
Attending: SURGERY
Payer: COMMERCIAL

## 2017-06-19 DIAGNOSIS — I83.811 VARICOSE VEINS OF RIGHT LOWER EXTREMITIES WITH PAIN: ICD-10-CM

## 2017-06-19 PROCEDURE — 93971 EXTREMITY STUDY: CPT | Mod: RT | Performed by: RADIOLOGY

## 2017-07-19 ENCOUNTER — TRANSFERRED RECORDS (OUTPATIENT)
Dept: HEALTH INFORMATION MANAGEMENT | Facility: CLINIC | Age: 47
End: 2017-07-19

## 2018-01-03 ENCOUNTER — ALLIED HEALTH/NURSE VISIT (OUTPATIENT)
Dept: NURSING | Facility: CLINIC | Age: 48
End: 2018-01-03
Payer: COMMERCIAL

## 2018-01-03 DIAGNOSIS — Z23 NEED FOR PROPHYLACTIC VACCINATION AND INOCULATION AGAINST INFLUENZA: Primary | ICD-10-CM

## 2018-01-03 PROCEDURE — 90471 IMMUNIZATION ADMIN: CPT

## 2018-01-03 PROCEDURE — 99207 ZZC NO CHARGE NURSE ONLY: CPT

## 2018-01-03 PROCEDURE — 90686 IIV4 VACC NO PRSV 0.5 ML IM: CPT

## 2018-01-03 NOTE — MR AVS SNAPSHOT
After Visit Summary   1/3/2018    Marcell Rodriguez    MRN: [id number]           Patient Information     Date Of Birth          1970        Visit Information        Provider Department      1/3/2018 7:30 AM AN ANCILLARY [facility]        Today's Diagnoses     Need for prophylactic vaccination and inoculation against influenza    -  1       Follow-ups after your visit        Who to contact     If you have questions or need follow up information about today's clinic visit or your schedule please contact [facility] directly at [phone].  Normal or non-critical lab and imaging results will be communicated to you by Ayeah Gameshart, letter or phone within 4 business days after the clinic has received the results. If you do not hear from us within 7 days, please contact the clinic through Ybraint or phone. If you have a critical or abnormal lab result, we will notify you by phone as soon as possible.  Submit refill requests through Profitero or call your pharmacy and they will forward the refill request to us. Please allow 3 business days for your refill to be completed.          Additional Information About Your Visit        MyChart Information     Profitero gives you secure access to your electronic health record. If you see a primary care provider, you can also send messages to your care team and make appointments. If you have questions, please call your primary care clinic.  If you do not have a primary care provider, please call [phone] and they will assist you.        Care EveryWhere ID     This is your Care EveryWhere ID. This could be used by other organizations to access your [place] medical records  [id number]         Blood Pressure from Last 3 Encounters:   06/06/17 119/78   05/25/17 119/83   05/18/17 119/86    Weight from Last 3 Encounters:   06/06/17 213 lb (96.6 kg)   05/25/17 209 lb 6.4 oz (95 kg)   05/18/17 214 lb 6.4 oz (97.3 kg)              We Performed the  Following     FLU VAC, SPLIT VIRUS IM > 3 YO (QUADRIVALENT) [87189]     Vaccine Administration, Initial [89806]        Primary Care Provider Office Phone # Fax #    Gerald Mckeon [phone] [address] [facility] [address]        Equal Access to Services     JOYCE PRADHAN : Hadii aad ku hadasho Soomaali, waaxda luqadaha, qaybta kaalmada adeegyada, waxay idiin hayaan adeeg tigist lachandnin . So [facility] [phone].    ATENCIÓN: Si habla español, tiene a feliciano disposición servicios gratuitos de asistencia lingüística. Llame al [phone].    We comply with applicable federal civil rights laws and [place] laws. We do not discriminate on the basis of race, color, national origin, age, disability, sex, sexual orientation, or gender identity.            Thank you!     Thank you for choosing [facility]  for your care. Our goal is always to provide you with excellent care. Hearing back from our patients is one way we can continue to improve our services. Please take a few minutes to complete the written survey that you may receive in the mail after your visit with us. Thank you!             Your Updated Medication List - Protect others around you: Learn how to safely use, store and throw away your medicines at www.disposemymeds.org.          This list is accurate as of: 1/3/18  7:37 AM.  Always use your most recent med list.                   Brand Name Dispense Instructions for use Diagnosis    Multi-vitamin Tabs tablet      Take 1 tablet by mouth daily        order for DME     2 Package    15-20 mm mercury thigh high compression stockings 1-2 pairs    Varicose veins of right lower extremities with pain       priLOSEC 20 MG CR capsule   Generic drug:  omeprazole      1 CAPSULE DAILY        rizatriptan 10 MG tablet    MAXALT    30 tablet    TAKE ONE TABLET BY MOUTH AT ONSET OF HEADACHE FOR MIGRAINE, MAY REPEAT DOSE AFTER 2 HOURS IF NEEDED.    Vascular headache       VIBERZI  100 MG tablet   Generic drug:  eluxadoline      Take 100 mg by mouth 2 times daily (with meals)

## 2018-01-03 NOTE — PROGRESS NOTES

## 2018-02-08 ENCOUNTER — TRANSFERRED RECORDS (OUTPATIENT)
Dept: HEALTH INFORMATION MANAGEMENT | Facility: CLINIC | Age: 48
End: 2018-02-08

## 2018-03-22 DIAGNOSIS — G44.1 VASCULAR HEADACHE: ICD-10-CM

## 2018-03-23 RX ORDER — RIZATRIPTAN BENZOATE 10 MG/1
TABLET ORAL
Qty: 30 TABLET | Refills: 0 | Status: SHIPPED | OUTPATIENT
Start: 2018-03-23 | End: 2018-08-08

## 2018-07-05 ENCOUNTER — TRANSFERRED RECORDS (OUTPATIENT)
Dept: HEALTH INFORMATION MANAGEMENT | Facility: CLINIC | Age: 48
End: 2018-07-05

## 2018-07-30 ENCOUNTER — DOCUMENTATION ONLY (OUTPATIENT)
Dept: LAB | Facility: CLINIC | Age: 48
End: 2018-07-30

## 2018-07-30 DIAGNOSIS — Z00.00 ROUTINE GENERAL MEDICAL EXAMINATION AT A HEALTH CARE FACILITY: ICD-10-CM

## 2018-07-30 DIAGNOSIS — E78.5 HYPERLIPIDEMIA LDL GOAL <130: Primary | ICD-10-CM

## 2018-07-30 NOTE — PROGRESS NOTES
Please review lab orders sign and close encounter. Gloria BRUCE      Labs 7/31/18  Physical 8/8/18

## 2018-07-31 DIAGNOSIS — Z00.00 ROUTINE GENERAL MEDICAL EXAMINATION AT A HEALTH CARE FACILITY: ICD-10-CM

## 2018-07-31 DIAGNOSIS — E78.5 HYPERLIPIDEMIA LDL GOAL <130: ICD-10-CM

## 2018-07-31 LAB
ALBUMIN SERPL-MCNC: 3.6 G/DL (ref 3.4–5)
ALP SERPL-CCNC: 46 U/L (ref 40–150)
ALT SERPL W P-5'-P-CCNC: 38 U/L (ref 0–70)
ANION GAP SERPL CALCULATED.3IONS-SCNC: 7 MMOL/L (ref 3–14)
AST SERPL W P-5'-P-CCNC: 23 U/L (ref 0–45)
BILIRUB SERPL-MCNC: 0.4 MG/DL (ref 0.2–1.3)
BUN SERPL-MCNC: 14 MG/DL (ref 7–30)
CALCIUM SERPL-MCNC: 8.5 MG/DL (ref 8.5–10.1)
CHLORIDE SERPL-SCNC: 105 MMOL/L (ref 94–109)
CHOLEST SERPL-MCNC: 235 MG/DL
CO2 SERPL-SCNC: 27 MMOL/L (ref 20–32)
CREAT SERPL-MCNC: 0.97 MG/DL (ref 0.66–1.25)
GFR SERPL CREATININE-BSD FRML MDRD: 82 ML/MIN/1.7M2
GLUCOSE SERPL-MCNC: 88 MG/DL (ref 70–99)
HDLC SERPL-MCNC: 40 MG/DL
LDLC SERPL CALC-MCNC: 144 MG/DL
NONHDLC SERPL-MCNC: 195 MG/DL
POTASSIUM SERPL-SCNC: 4 MMOL/L (ref 3.4–5.3)
PROT SERPL-MCNC: 7.3 G/DL (ref 6.8–8.8)
SODIUM SERPL-SCNC: 139 MMOL/L (ref 133–144)
TRIGL SERPL-MCNC: 254 MG/DL

## 2018-07-31 PROCEDURE — 36415 COLL VENOUS BLD VENIPUNCTURE: CPT | Performed by: FAMILY MEDICINE

## 2018-07-31 PROCEDURE — 80061 LIPID PANEL: CPT | Performed by: FAMILY MEDICINE

## 2018-07-31 PROCEDURE — 80053 COMPREHEN METABOLIC PANEL: CPT | Performed by: FAMILY MEDICINE

## 2018-08-08 ENCOUNTER — OFFICE VISIT (OUTPATIENT)
Dept: FAMILY MEDICINE | Facility: CLINIC | Age: 48
End: 2018-08-08
Payer: COMMERCIAL

## 2018-08-08 VITALS
DIASTOLIC BLOOD PRESSURE: 76 MMHG | WEIGHT: 221 LBS | SYSTOLIC BLOOD PRESSURE: 124 MMHG | HEIGHT: 69 IN | BODY MASS INDEX: 32.73 KG/M2 | OXYGEN SATURATION: 96 % | HEART RATE: 84 BPM | TEMPERATURE: 97.9 F | RESPIRATION RATE: 18 BRPM

## 2018-08-08 DIAGNOSIS — Z00.00 ROUTINE GENERAL MEDICAL EXAMINATION AT A HEALTH CARE FACILITY: Primary | ICD-10-CM

## 2018-08-08 DIAGNOSIS — G44.1 VASCULAR HEADACHE: ICD-10-CM

## 2018-08-08 PROCEDURE — 99396 PREV VISIT EST AGE 40-64: CPT | Performed by: FAMILY MEDICINE

## 2018-08-08 RX ORDER — RIZATRIPTAN BENZOATE 10 MG/1
TABLET ORAL
Qty: 30 TABLET | Refills: 3 | Status: SHIPPED | OUTPATIENT
Start: 2018-08-08 | End: 2019-05-30

## 2018-08-08 RX ORDER — MONTELUKAST SODIUM 4 MG/1
1 TABLET, CHEWABLE ORAL 2 TIMES DAILY
COMMUNITY
End: 2019-05-30

## 2018-08-08 RX ORDER — DESVENLAFAXINE 50 MG/1
TABLET, FILM COATED, EXTENDED RELEASE ORAL
Refills: 3 | COMMUNITY
Start: 2018-03-09 | End: 2019-05-30

## 2018-08-08 ASSESSMENT — PAIN SCALES - GENERAL: PAINLEVEL: NO PAIN (0)

## 2018-08-08 ASSESSMENT — ENCOUNTER SYMPTOMS
MYALGIAS: 0
JOINT SWELLING: 0
ABDOMINAL PAIN: 1
DIZZINESS: 0
DIARRHEA: 1
FREQUENCY: 0
SORE THROAT: 0
FEVER: 0
WEAKNESS: 0
CONSTIPATION: 0
CHILLS: 0
HEARTBURN: 1
PARESTHESIAS: 0
DYSURIA: 0
HEMATOCHEZIA: 0
EYE PAIN: 0
HEMATURIA: 0
NERVOUS/ANXIOUS: 0
COUGH: 0
ARTHRALGIAS: 1
HEADACHES: 1
SHORTNESS OF BREATH: 0
PALPITATIONS: 0
NAUSEA: 0

## 2018-08-08 NOTE — MR AVS SNAPSHOT
"              After Visit Summary   8/8/2018    Marcell Rodriguez    MRN: [id number]           Patient Information     Date Of Birth          1970        Visit Information        Provider Department      8/8/2018 10:15 AM Gerald Mckeon MD [facility]        Today's Diagnoses     Routine general medical examination at a health care facility    -  1    Vascular headaches           Follow-ups after your visit        Who to contact     If you have questions or need follow up information about today's clinic visit or your schedule please contact [facility] directly at [phone].  Normal or non-critical lab and imaging results will be communicated to you by Diavibehart, letter or phone within 4 business days after the clinic has received the results. If you do not hear from us within 7 days, please contact the clinic through Radius Appt or phone. If you have a critical or abnormal lab result, we will notify you by phone as soon as possible.  Submit refill requests through Lockdown Networks or call your pharmacy and they will forward the refill request to us. Please allow 3 business days for your refill to be completed.          Additional Information About Your Visit        MyChart Information     [employer] gives you secure access to your electronic health record. If you see a primary care provider, you can also send messages to your care team and make appointments. If you have questions, please call your primary care clinic.  If you do not have a primary care provider, please call [phone] and they will assist you.        Care EveryWhere ID     This is your Care EveryWhere ID. This could be used by other organizations to access your [place] medical records  [id number]        Your Vitals Were     Pulse Temperature Respirations Height Pulse Oximetry BMI (Body Mass Index)    84 97.9  F (36.6  C) (Oral) 18 5' 8.5\" (1.74 m) 96% 33.11 kg/m2       Blood Pressure from Last 3 Encounters:   08/08/18 " 124/76   06/06/17 119/78   05/25/17 119/83    Weight from Last 3 Encounters:   08/08/18 221 lb (100.2 kg)   06/06/17 213 lb (96.6 kg)   05/25/17 209 lb 6.4 oz (95 kg)              Today, you had the following     No orders found for display         Where to get your medicines      These medications were sent to [facility] [address] [place] [address] AT [facility] & [address], [facility] [address]     Phone:  [phone]     rizatriptan 10 MG tablet          Primary Care Provider Office Phone # Fax #    Gerald Mckeon [phone] [address] [facility] [address]        Equal Access to Services     CYNTHIA PRADHAN : Hadii aad jessica hadasho Soomaali, waaxda luqadaha, qaybta kaalmada adeegyada, harley portillo . So [facility] [phone].    ATENCIÓN: Si habla español, tiene a feliciano disposición servicios gratuitos de asistencia lingüística. Marline al [phone].    We comply with applicable federal civil rights laws and [place] laws. We do not discriminate on the basis of race, color, national origin, age, disability, sex, sexual orientation, or gender identity.            Thank you!     Thank you for choosing [facility]  for your care. Our goal is always to provide you with excellent care. Hearing back from our patients is one way we can continue to improve our services. Please take a few minutes to complete the written survey that you may receive in the mail after your visit with us. Thank you!             Your Updated Medication List - Protect others around you: Learn how to safely use, store and throw away your medicines at www.disposemymeds.org.          This list is accurate as of 8/8/18 10:38 AM.  Always use your most recent med list.                   Brand Name Dispense Instructions for use Diagnosis    colestipol 1 g tablet    COLESTID     Take 1 g by mouth 2 times daily         desvenlafaxine succinate 50 MG 24 hr tablet    PRISTIQ     TK 1 T PO QD        Multi-vitamin Tabs tablet      Take 1 tablet by mouth daily        priLOSEC 20 MG CR capsule   Generic drug:  omeprazole      1 CAPSULE DAILY        * rizatriptan 10 MG tablet    MAXALT    30 tablet    TAKE ONE TABLET BY MOUTH AT ONSET OF HEADACHE FOR MIGRAINE, MAY REPEAT DOSE AFTER 2 HOURS IF NEEDED.    Vascular headache       * rizatriptan 10 MG tablet    MAXALT    30 tablet    TAKE ONE TABLET BY MOUTH AT ONSET OF HEADACHE FOR MIGRAINE, MAY REPEAT DOSE AFTER 2 HOURS IF NEEDED.    Vascular headache       * Notice:  This list has 2 medication(s) that are the same as other medications prescribed for you. Read the directions carefully, and ask your doctor or other care provider to review them with you.

## 2018-08-08 NOTE — NURSING NOTE
"Chief Complaint   Patient presents with     Physical       Initial /76  Pulse 84  Temp 97.9  F (36.6  C) (Oral)  Resp 18  Ht 5' 8.5\" (1.74 m)  Wt 221 lb (100.2 kg)  SpO2 96%  BMI 33.11 kg/m2 Estimated body mass index is 33.11 kg/(m^2) as calculated from the following:    Height as of this encounter: 5' 8.5\" (1.74 m).    Weight as of this encounter: 221 lb (100.2 kg).  Medication Reconciliation: complete  Subha Arias M.A.    "

## 2018-08-08 NOTE — PROGRESS NOTES
SUBJECTIVE:   CC: Marcell Rodriguez is an 47 year old male who presents for preventative health visit.     Physical   Annual:     Getting at least 3 servings of Calcium per day:  Yes    Bi-annual eye exam:  NO    Dental care twice a year:  Yes    Sleep apnea or symptoms of sleep apnea:  Excessive snoring    Diet:  Other    Frequency of exercise:  None    Taking medications regularly:  Yes    Medication side effects:  None    Additional concerns today:  No             Today's PHQ-2 Score:   PHQ-2 ( 1999 Pfizer) 8/8/2018   Q1: Little interest or pleasure in doing things 0   Q2: Feeling down, depressed or hopeless 0   PHQ-2 Score 0   Q1: Little interest or pleasure in doing things Not at all   Q2: Feeling down, depressed or hopeless Not at all   PHQ-2 Score 0       Abuse: Current or Past(Physical, Sexual or Emotional)- No  Do you feel safe in your environment - Yes    Social History   Substance Use Topics     Smoking status: Never Smoker     Smokeless tobacco: Never Used      Comment: Lives with a smoker, smokes outside     Alcohol use No     Alcohol Use 8/8/2018   If you drink alcohol do you typically have greater than 3 drinks per day OR greater than 7 drinks per week? Not Applicable       Last PSA: No results found for: PSA    Reviewed orders with patient. Reviewed health maintenance and updated orders accordingly - Yes       Reviewed and updated as needed this visit by clinical staff  Tobacco  Allergies  Meds  Med Hx  Surg Hx  Fam Hx  Soc Hx        Reviewed and updated as needed this visit by Provider          Review of Systems   Constitutional: Negative for chills and fever.   HENT: Negative for congestion, ear pain, hearing loss and sore throat.    Eyes: Negative for pain and visual disturbance.   Respiratory: Negative for cough and shortness of breath.    Cardiovascular: Negative for chest pain, palpitations and peripheral edema.   Gastrointestinal: Positive for abdominal pain, diarrhea and heartburn.  "Negative for constipation, hematochezia and nausea.   Genitourinary: Negative for discharge, dysuria, frequency, genital sores, hematuria, impotence and urgency.   Musculoskeletal: Positive for arthralgias. Negative for joint swelling and myalgias.   Skin: Negative for rash.   Neurological: Positive for headaches. Negative for dizziness, weakness and paresthesias.   Psychiatric/Behavioral: Negative for mood changes. The patient is not nervous/anxious.    The 10-year ASCVD risk score (Mario LONNY Jr, et al., 2013) is: 4.8%    Values used to calculate the score:      Age: 47 years      Sex: Male      Is Non- : No      Diabetic: No      Tobacco smoker: No      Systolic Blood Pressure: 124 mmHg      Is BP treated: No      HDL Cholesterol: 40 mg/dL      Total Cholesterol: 235 mg/dL      OBJECTIVE:   /76  Pulse 84  Temp 97.9  F (36.6  C) (Oral)  Resp 18  Ht 5' 8.5\" (1.74 m)  Wt 221 lb (100.2 kg)  SpO2 96%  BMI 33.11 kg/m2    Physical Exam  GENERAL: healthy, alert and no distress  EYES: Eyes grossly normal to inspection, PERRL and conjunctivae and sclerae normal  HENT: ear canals and TM's normal, nose and mouth without ulcers or lesions  NECK: no adenopathy, no asymmetry, masses, or scars and thyroid normal to palpation  RESP: lungs clear to auscultation - no rales, rhonchi or wheezes  CV: regular rate and rhythm, normal S1 S2, no S3 or S4, no murmur, click or rub, no peripheral edema and peripheral pulses strong  ABDOMEN: soft, nontender, no hepatosplenomegaly, no masses and bowel sounds normal  MS: no gross musculoskeletal defects noted, no edema  SKIN: no suspicious lesions or rashes  NEURO: Normal strength and tone, mentation intact and speech normal  PSYCH: mentation appears normal, affect normal/bright    Diagnostic Test Results:  Reviewed lipid and BMP    ASSESSMENT/PLAN:       ICD-10-CM    1. Routine general medical examination at a health care facility Z00.00        COUNSELING: " "  Reviewed preventive health counseling, as reflected in patient instructions       Healthy diet/nutrition       Vision screening    BP Readings from Last 1 Encounters:   08/08/18 124/76     Estimated body mass index is 33.11 kg/(m^2) as calculated from the following:    Height as of this encounter: 5' 8.5\" (1.74 m).    Weight as of this encounter: 221 lb (100.2 kg).      Weight management plan: walking and eating less     reports that he has never smoked. He has never used smokeless tobacco.      Counseling Resources:  ATP IV Guidelines  Pooled Cohorts Equation Calculator  FRAX Risk Assessment  ICSI Preventive Guidelines  Dietary Guidelines for Americans, 2010  USDA's MyPlate  ASA Prophylaxis  Lung CA Screening    Gerald Mckeon MD  [facility] [place]  Answers for HPI/ROS submitted by the patient on 8/8/2018   PHQ-2 Score: 0    "

## 2018-10-19 ENCOUNTER — OFFICE VISIT (OUTPATIENT)
Dept: FAMILY MEDICINE | Facility: CLINIC | Age: 48
End: 2018-10-19
Payer: COMMERCIAL

## 2018-10-19 VITALS
HEART RATE: 67 BPM | WEIGHT: 225 LBS | TEMPERATURE: 98.2 F | OXYGEN SATURATION: 97 % | SYSTOLIC BLOOD PRESSURE: 114 MMHG | DIASTOLIC BLOOD PRESSURE: 75 MMHG | RESPIRATION RATE: 12 BRPM | BODY MASS INDEX: 33.71 KG/M2

## 2018-10-19 DIAGNOSIS — I88.9 LYMPHADENITIS: Primary | ICD-10-CM

## 2018-10-19 PROCEDURE — 99213 OFFICE O/P EST LOW 20 MIN: CPT | Performed by: FAMILY MEDICINE

## 2018-10-19 RX ORDER — CEPHALEXIN 500 MG/1
500 CAPSULE ORAL 4 TIMES DAILY
Qty: 20 CAPSULE | Refills: 0 | Status: SHIPPED | OUTPATIENT
Start: 2018-10-19 | End: 2018-10-24

## 2018-10-19 NOTE — MR AVS SNAPSHOT
After Visit Summary   10/19/2018    Marcell Rodriguez    MRN: [id number]           Patient Information     Date Of Birth          1970        Visit Information        Provider Department      10/19/2018 3:40 PM Adan Rodrigez MD [facility]        Today's Diagnoses     Lymphadenitis    -  1       Follow-ups after your visit        Who to contact     If you have questions or need follow up information about today's clinic visit or your schedule please contact [facility] directly at [phone].  Normal or non-critical lab and imaging results will be communicated to you by MyChart, letter or phone within 4 business days after the clinic has received the results. If you do not hear from us within 7 days, please contact the clinic through Snaapiqt or phone. If you have a critical or abnormal lab result, we will notify you by phone as soon as possible.  Submit refill requests through Appurify or call your pharmacy and they will forward the refill request to us. Please allow 3 business days for your refill to be completed.          Additional Information About Your Visit        MyChart Information     Appurify gives you secure access to your electronic health record. If you see a primary care provider, you can also send messages to your care team and make appointments. If you have questions, please call your primary care clinic.  If you do not have a primary care provider, please call [phone] and they will assist you.        Care EveryWhere ID     This is your Care EveryWhere ID. This could be used by other organizations to access your [place] medical records  [id number]        Your Vitals Were     Pulse Temperature Respirations Pulse Oximetry BMI (Body Mass Index)       67 98.2  F (36.8  C) (Oral) 12 97% 33.71 kg/m2        Blood Pressure from Last 3 Encounters:   10/19/18 114/75   08/08/18 124/76   06/06/17 119/78    Weight from Last 3 Encounters:   10/19/18 225 lb  (102.1 kg)   08/08/18 221 lb (100.2 kg)   06/06/17 213 lb (96.6 kg)              Today, you had the following     No orders found for display         Today's Medication Changes          These changes are accurate as of 10/19/18  4:13 PM.  If you have any questions, ask your nurse or doctor.               Start taking these medicines.        Dose/Directions    cephALEXin 500 MG capsule   Commonly known as:  KEFLEX   Used for:  Lymphadenitis   Started by:  Adan Rodrigez MD        Dose:  500 mg   Take 1 capsule (500 mg) by mouth 4 times daily for 5 days   Quantity:  20 capsule   Refills:  0            Where to get your medicines      Some of these will need a paper prescription and others can be bought over the counter.  Ask your nurse if you have questions.     Bring a paper prescription for each of these medications     cephALEXin 500 MG capsule                Primary Care Provider Office Phone # Fax #    Gerald Jerry Mckeon [phone] [address] [facility] [address]        Equal Access to Services     [facility]: Hadii aad ku hadasho Soomaali, waaxda luqadaha, qaybta kaalmada adeegyada, waxay idiin haydeyaniran moises portillo . So [facility] [phone].    ATENCIÓN: Si habla español, tiene a feliciano disposición servicios gratuitos de asistencia lingüística. Llame al [phone].    We comply with applicable federal civil rights laws and [place] laws. We do not discriminate on the basis of race, color, national origin, age, disability, sex, sexual orientation, or gender identity.            Thank you!     Thank you for choosing [facility]  for your care. Our goal is always to provide you with excellent care. Hearing back from our patients is one way we can continue to improve our services. Please take a few minutes to complete the written survey that you may receive in the mail after your visit with us. Thank you!             Your Updated Medication List - Protect  others around you: Learn how to safely use, store and throw away your medicines at www.disposemymeds.org.          This list is accurate as of 10/19/18  4:13 PM.  Always use your most recent med list.                   Brand Name Dispense Instructions for use Diagnosis    cephALEXin 500 MG capsule    KEFLEX    20 capsule    Take 1 capsule (500 mg) by mouth 4 times daily for 5 days    Lymphadenitis       colestipol 1 g tablet    COLESTID     Take 1 g by mouth 2 times daily        desvenlafaxine succinate 50 MG 24 hr tablet    PRISTIQ     TK 1 T PO QD        Multi-vitamin Tabs tablet      Take 1 tablet by mouth daily        priLOSEC 20 MG CR capsule   Generic drug:  omeprazole      1 CAPSULE DAILY        * rizatriptan 10 MG tablet    MAXALT    30 tablet    TAKE ONE TABLET BY MOUTH AT ONSET OF HEADACHE FOR MIGRAINE, MAY REPEAT DOSE AFTER 2 HOURS IF NEEDED.    Vascular headache       * rizatriptan 10 MG tablet    MAXALT    30 tablet    TAKE ONE TABLET BY MOUTH AT ONSET OF HEADACHE FOR MIGRAINE, MAY REPEAT DOSE AFTER 2 HOURS IF NEEDED.    Vascular headache       * Notice:  This list has 2 medication(s) that are the same as other medications prescribed for you. Read the directions carefully, and ask your doctor or other care provider to review them with you.

## 2018-10-19 NOTE — PROGRESS NOTES
CHIEF COMPLAINT    Right-sided neck pain for 2 days.      HISTORY    Patient has a 2-day history of a localized tender area in the right anterior cervical region.  He thinks it may be a lymph gland.  Somewhat worse with neck motion or with jaw movement.  He is not having upper respiratory congestion or sore throat presently.  No fevers or chills.  He did take some naproxen which she feels may have relieved some of this discomfort.      Patient Active Problem List   Diagnosis     Vascular headaches     High cholesterol     Encounter for other general counseling or advice on contraception     Shoulder strain     Hyperlipidemia     Hyperlipidemia LDL goal <130     GERD (gastroesophageal reflux disease)     Irritable bowel syndrome with diarrhea       Current Outpatient Prescriptions   Medication Sig Dispense Refill     cephALEXin (KEFLEX) 500 MG capsule Take 1 capsule (500 mg) by mouth 4 times daily for 5 days 20 capsule 0     colestipol (COLESTID) 1 g tablet Take 1 g by mouth 2 times daily       desvenlafaxine succinate (PRISTIQ) 50 MG 24 hr tablet TK 1 T PO QD  3     multivitamin, therapeutic with minerals (MULTI-VITAMIN) TABS Take 1 tablet by mouth daily       PRILOSEC 20 MG PO CPDR 1 CAPSULE DAILY       rizatriptan (MAXALT) 10 MG tablet TAKE ONE TABLET BY MOUTH AT ONSET OF HEADACHE FOR MIGRAINE, MAY REPEAT DOSE AFTER 2 HOURS IF NEEDED. 30 tablet 3     rizatriptan (MAXALT) 10 MG tablet TAKE ONE TABLET BY MOUTH AT ONSET OF HEADACHE FOR MIGRAINE, MAY REPEAT DOSE AFTER 2 HOURS IF NEEDED. 30 tablet 3       REVIEW OF SYSTEMS    No fever.  No cough or congestion.  No rashes.      Past Medical History:   Diagnosis Date     Arthritis 1999    Neck     High cholesterol      Hyperlipidemia      Other general counseling and advice for contraceptive management      Shoulder strain      Vascular headaches      Vascular headaches      Vascular headaches      Vascular headaches        EXAM  /75  Pulse 67  Temp 98.2  F  (36.8  C) (Oral)  Resp 12  Wt 225 lb (102.1 kg)  SpO2 97%  BMI 33.71 kg/m2    HEENT.  TMs and ear canals normal, pharynx not inflamed, small tonsils.  Neck. One slightly enlarged right anterior cervical area of adenopathy.  Minimally tender at this time.  Size is 1-2 m.  No posterior cervical nodes.  No thyromegaly.  Skin.  No rashes.      (I88.9) Lymphadenitis  (primary encounter diagnosis)  Comment:     Suspect localized reactive lymphadenopathy.  Plan: cephALEXin (KEFLEX) 500 MG capsule          We discussed observation for a day or so.  He may continue his naproxen.  If this remains painful or if it becomes more symptomatic, he is to fill the Rx for cephalexin.  Recheck if symptoms fail to improve or if they are worsening.

## 2019-02-14 ENCOUNTER — TRANSFERRED RECORDS (OUTPATIENT)
Dept: HEALTH INFORMATION MANAGEMENT | Facility: CLINIC | Age: 49
End: 2019-02-14

## 2019-02-27 ENCOUNTER — TRANSFERRED RECORDS (OUTPATIENT)
Dept: HEALTH INFORMATION MANAGEMENT | Facility: CLINIC | Age: 49
End: 2019-02-27

## 2019-03-01 ENCOUNTER — TELEPHONE (OUTPATIENT)
Dept: FAMILY MEDICINE | Facility: CLINIC | Age: 49
End: 2019-03-01

## 2019-03-01 NOTE — TELEPHONE ENCOUNTER
Please abstract the following data from this visit with this patient into the appropriate field in Epic:    Colonoscopy done on this date: 2/27/19 (approximately), by this group: [facility], results were repeat in 5 years.

## 2019-03-05 ENCOUNTER — TRANSFERRED RECORDS (OUTPATIENT)
Dept: HEALTH INFORMATION MANAGEMENT | Facility: CLINIC | Age: 49
End: 2019-03-05

## 2019-03-05 DIAGNOSIS — R19.7 DIARRHEA OF PRESUMED INFECTIOUS ORIGIN: Primary | ICD-10-CM

## 2019-03-05 DIAGNOSIS — R19.7 DIARRHEA OF PRESUMED INFECTIOUS ORIGIN: ICD-10-CM

## 2019-03-05 PROCEDURE — 86316 IMMUNOASSAY TUMOR OTHER: CPT | Mod: 90 | Performed by: INTERNAL MEDICINE

## 2019-03-05 PROCEDURE — 83930 ASSAY OF BLOOD OSMOLALITY: CPT | Performed by: INTERNAL MEDICINE

## 2019-03-05 PROCEDURE — 36415 COLL VENOUS BLD VENIPUNCTURE: CPT | Performed by: INTERNAL MEDICINE

## 2019-03-05 PROCEDURE — 82941 ASSAY OF GASTRIN: CPT | Mod: 90 | Performed by: INTERNAL MEDICINE

## 2019-03-05 PROCEDURE — 99000 SPECIMEN HANDLING OFFICE-LAB: CPT | Performed by: INTERNAL MEDICINE

## 2019-03-05 PROCEDURE — 84586 ASSAY OF VIP: CPT | Mod: 90 | Performed by: INTERNAL MEDICINE

## 2019-03-05 PROCEDURE — 82308 ASSAY OF CALCITONIN: CPT | Mod: 90 | Performed by: INTERNAL MEDICINE

## 2019-03-06 LAB — OSMOLALITY SERPL: 293 MMOL/KG (ref 275–295)

## 2019-03-07 LAB
CALCIT SERPL-MCNC: 4.6 PG/ML (ref 0–7.5)
GASTRIN SERPL-MCNC: 28 PG/ML (ref 0–100)

## 2019-03-08 LAB — CGA SERPL-MCNC: 161 NG/ML (ref 0–95)

## 2019-03-13 PROCEDURE — 99000 SPECIMEN HANDLING OFFICE-LAB: CPT | Performed by: INTERNAL MEDICINE

## 2019-03-13 PROCEDURE — 82710 FATS/LIPIDS FECES QUANT: CPT | Mod: 90 | Performed by: INTERNAL MEDICINE

## 2019-03-14 DIAGNOSIS — R19.7 DIARRHEA OF PRESUMED INFECTIOUS ORIGIN: ICD-10-CM

## 2019-03-14 PROCEDURE — 83935 ASSAY OF URINE OSMOLALITY: CPT | Mod: 90 | Performed by: INTERNAL MEDICINE

## 2019-03-14 PROCEDURE — 82103 ALPHA-1-ANTITRYPSIN TOTAL: CPT | Mod: 90 | Performed by: INTERNAL MEDICINE

## 2019-03-14 PROCEDURE — 84302 ASSAY OF SWEAT SODIUM: CPT | Mod: 90 | Performed by: INTERNAL MEDICINE

## 2019-03-14 PROCEDURE — 99000 SPECIMEN HANDLING OFFICE-LAB: CPT | Performed by: INTERNAL MEDICINE

## 2019-03-14 PROCEDURE — 84133 ASSAY OF URINE POTASSIUM: CPT | Mod: 90 | Performed by: INTERNAL MEDICINE

## 2019-03-15 DIAGNOSIS — R19.7 DIARRHEA OF PRESUMED INFECTIOUS ORIGIN: ICD-10-CM

## 2019-03-16 LAB
FAT 24H STL-MRATE: NORMAL G/(24.H)
OSMOLALITY STL: NORMAL MOSM/KG (ref 280–303)
POTASSIUM STL-SCNC: NORMAL MMOL/L
SODIUM STL-SCNC: NORMAL MMOL/L

## 2019-03-17 PROCEDURE — 83497 ASSAY OF 5-HIAA: CPT | Mod: 90 | Performed by: INTERNAL MEDICINE

## 2019-03-17 PROCEDURE — 99000 SPECIMEN HANDLING OFFICE-LAB: CPT | Performed by: INTERNAL MEDICINE

## 2019-03-18 ENCOUNTER — TRANSFERRED RECORDS (OUTPATIENT)
Dept: HEALTH INFORMATION MANAGEMENT | Facility: CLINIC | Age: 49
End: 2019-03-18

## 2019-03-18 DIAGNOSIS — R19.7 DIARRHEA OF PRESUMED INFECTIOUS ORIGIN: ICD-10-CM

## 2019-03-18 LAB — LAB SCANNED RESULT: NORMAL

## 2019-03-21 LAB
5HIAA & CREATININE UR-IMP: NORMAL
5OH-INDOLEACETATE 24H UR-MCNC: 2 MG/L
5OH-INDOLEACETATE 24H UR-MRATE: 6 MG/D (ref 0–15)
5OH-INDOLEACETATE/CREAT 24H UR: 3 MG/GCR (ref 0–14)
COLLECT DURATION TIME UR: 24 HR
CREAT 24H UR-MRATE: 2053 MG/D (ref 1000–2500)
CREAT SERPL-MCNC: 69 MG/DL
SPECIMEN VOL ?TM UR: 2975 ML

## 2019-03-22 ENCOUNTER — TRANSFERRED RECORDS (OUTPATIENT)
Dept: HEALTH INFORMATION MANAGEMENT | Facility: CLINIC | Age: 49
End: 2019-03-22

## 2019-03-22 LAB — VIP SERPL-MCNC: <13 PG/ML (ref 0–60)

## 2019-03-26 ENCOUNTER — TRANSFERRED RECORDS (OUTPATIENT)
Dept: HEALTH INFORMATION MANAGEMENT | Facility: CLINIC | Age: 49
End: 2019-03-26

## 2019-04-13 ENCOUNTER — TRANSFERRED RECORDS (OUTPATIENT)
Dept: HEALTH INFORMATION MANAGEMENT | Facility: CLINIC | Age: 49
End: 2019-04-13

## 2019-04-13 DIAGNOSIS — G44.1 VASCULAR HEADACHE: ICD-10-CM

## 2019-04-15 RX ORDER — RIZATRIPTAN BENZOATE 10 MG/1
TABLET ORAL
Qty: 30 TABLET | Refills: 0 | OUTPATIENT
Start: 2019-04-15

## 2019-05-09 ENCOUNTER — MYC MEDICAL ADVICE (OUTPATIENT)
Dept: FAMILY MEDICINE | Facility: CLINIC | Age: 49
End: 2019-05-09

## 2019-05-09 DIAGNOSIS — E78.00 HIGH CHOLESTEROL: Primary | ICD-10-CM

## 2019-05-09 NOTE — TELEPHONE ENCOUNTER
Need previsit labs-see orders and close encounter if nothing else needed. Please notify patient if no labs are needed./Carina Dacosta,       Physical 5/30/19

## 2019-05-10 ENCOUNTER — TRANSFERRED RECORDS (OUTPATIENT)
Dept: HEALTH INFORMATION MANAGEMENT | Facility: CLINIC | Age: 49
End: 2019-05-10

## 2019-05-30 ENCOUNTER — OFFICE VISIT (OUTPATIENT)
Dept: FAMILY MEDICINE | Facility: CLINIC | Age: 49
End: 2019-05-30
Payer: COMMERCIAL

## 2019-05-30 VITALS
BODY MASS INDEX: 30.36 KG/M2 | TEMPERATURE: 98 F | RESPIRATION RATE: 18 BRPM | HEART RATE: 60 BPM | DIASTOLIC BLOOD PRESSURE: 75 MMHG | HEIGHT: 69 IN | WEIGHT: 205 LBS | SYSTOLIC BLOOD PRESSURE: 113 MMHG | OXYGEN SATURATION: 97 %

## 2019-05-30 DIAGNOSIS — Z00.00 ROUTINE GENERAL MEDICAL EXAMINATION AT A HEALTH CARE FACILITY: Primary | ICD-10-CM

## 2019-05-30 DIAGNOSIS — G44.1 VASCULAR HEADACHE: ICD-10-CM

## 2019-05-30 PROCEDURE — 99396 PREV VISIT EST AGE 40-64: CPT | Performed by: FAMILY MEDICINE

## 2019-05-30 RX ORDER — RIZATRIPTAN BENZOATE 10 MG/1
TABLET ORAL
Qty: 30 TABLET | Refills: 3 | Status: SHIPPED | OUTPATIENT
Start: 2019-05-30 | End: 2020-05-15

## 2019-05-30 ASSESSMENT — ENCOUNTER SYMPTOMS
SORE THROAT: 0
PALPITATIONS: 0
DIARRHEA: 1
CONSTIPATION: 0
ABDOMINAL PAIN: 0
FREQUENCY: 0
JOINT SWELLING: 0
COUGH: 0
SHORTNESS OF BREATH: 0
DYSURIA: 0
NERVOUS/ANXIOUS: 0
EYE PAIN: 0
FEVER: 0
NAUSEA: 0
HEMATOCHEZIA: 0
WEAKNESS: 0
HEADACHES: 1
CHILLS: 0
HEARTBURN: 1
ARTHRALGIAS: 0
PARESTHESIAS: 0
DIZZINESS: 0
MYALGIAS: 0
HEMATURIA: 0

## 2019-05-30 ASSESSMENT — PAIN SCALES - GENERAL: PAINLEVEL: NO PAIN (0)

## 2019-05-30 ASSESSMENT — MIFFLIN-ST. JEOR: SCORE: 1772.31

## 2019-05-30 NOTE — NURSING NOTE
"Chief Complaint   Patient presents with     Physical     refill med       Initial /75   Pulse 60   Temp 98  F (36.7  C) (Oral)   Resp 18   Ht 1.74 m (5' 8.5\")   Wt 93 kg (205 lb)   SpO2 97%   BMI 30.72 kg/m   Estimated body mass index is 30.72 kg/m  as calculated from the following:    Height as of this encounter: 1.74 m (5' 8.5\").    Weight as of this encounter: 93 kg (205 lb).  Medication Reconciliation: complete  Subha Arias M.A.    "

## 2019-05-30 NOTE — PROGRESS NOTES
SUBJECTIVE:   CC: Marcell Rodriguez is an 48 year old male who presents for preventative health visit.     Healthy Habits:     Getting at least 3 servings of Calcium per day:  Yes    Bi-annual eye exam:  Yes    Dental care twice a year:  Yes    Sleep apnea or symptoms of sleep apnea:  Excessive snoring    Diet:  Regular (no restrictions)    Frequency of exercise:  2-3 days/week    Duration of exercise:  15-30 minutes    Taking medications regularly:  Yes    Medication side effects:  Not applicable and None    PHQ-2 Total Score: 0    Additional concerns today:  No              Today's PHQ-2 Score:   PHQ-2 ( 1999 Pfizer) 5/30/2019   Q1: Little interest or pleasure in doing things 0   Q2: Feeling down, depressed or hopeless 0   PHQ-2 Score 0   Q1: Little interest or pleasure in doing things Not at all   Q2: Feeling down, depressed or hopeless Not at all   PHQ-2 Score 0       Abuse: Current or Past(Physical, Sexual or Emotional)- No  Do you feel safe in your environment? Yes    Social History     Tobacco Use     Smoking status: Never Smoker     Smokeless tobacco: Never Used     Tobacco comment: Lives with a smoker, smokes outside   Substance Use Topics     Alcohol use: No         Alcohol Use 5/30/2019   Prescreen: >3 drinks/day or >7 drinks/week? Not Applicable   Prescreen: >3 drinks/day or >7 drinks/week? -       Last PSA: No results found for: PSA    Reviewed orders with patient. Reviewed health maintenance and updated orders accordingly - Yes       Reviewed and updated as needed this visit by clinical staff  Tobacco  Allergies  Meds         Reviewed and updated as needed this visit by Provider            Review of Systems   Constitutional: Negative for chills and fever.   HENT: Negative for congestion, ear pain, hearing loss and sore throat.    Eyes: Negative for pain and visual disturbance.   Respiratory: Negative for cough and shortness of breath.    Cardiovascular: Negative for chest pain, palpitations and  "peripheral edema.   Gastrointestinal: Positive for diarrhea and heartburn. Negative for abdominal pain, constipation, hematochezia and nausea.   Genitourinary: Negative for discharge, dysuria, frequency, genital sores, hematuria, impotence and urgency.   Musculoskeletal: Negative for arthralgias, joint swelling and myalgias.   Skin: Negative for rash.   Neurological: Positive for headaches. Negative for dizziness, weakness and paresthesias.   Psychiatric/Behavioral: Negative for mood changes. The patient is not nervous/anxious.          OBJECTIVE:   /75   Pulse 60   Temp 98  F (36.7  C) (Oral)   Resp 18   Ht 1.74 m (5' 8.5\")   Wt 93 kg (205 lb)   SpO2 97%   BMI 30.72 kg/m      Physical Exam  GENERAL: healthy, alert and no distress  EYES: Eyes grossly normal to inspection, PERRL and conjunctivae and sclerae normal  HENT: ear canals and TM's normal, nose and mouth without ulcers or lesions  NECK: no adenopathy, no asymmetry, masses, or scars and thyroid normal to palpation  RESP: lungs clear to auscultation - no rales, rhonchi or wheezes  CV: regular rate and rhythm, normal S1 S2, no S3 or S4, no murmur, click or rub, no peripheral edema and peripheral pulses strong  ABDOMEN: soft, nontender, no hepatosplenomegaly, no masses and bowel sounds normal  MS: no gross musculoskeletal defects noted, no edema  SKIN: no suspicious lesions or rashes  NEURO: Normal strength and tone, mentation intact and speech normal  PSYCH: mentation appears normal, affect normal/bright    Diagnostic Test Results:  Labs reviewed in [facility]  The 10-year ASCVD risk score (Mariotripp MUKHERJEE Jr., et al., 2013) is: 4.4%    Values used to calculate the score:      Age: 48 years      Sex: Male      Is Non- : No      Diabetic: No      Tobacco smoker: No      Systolic Blood Pressure: 113 mmHg      Is BP treated: No      HDL Cholesterol: 40 mg/dL      Total Cholesterol: 235 mg/dL      ASSESSMENT/PLAN:       ICD-10-CM    1. " "Routine general medical examination at a health care facility Z00.00    2. Vascular headaches G44.1 rizatriptan (MAXALT) 10 MG tablet       COUNSELING:   Reviewed preventive health counseling, as reflected in patient instructions       Regular exercise       Healthy diet/nutrition    Estimated body mass index is 30.72 kg/m  as calculated from the following:    Height as of this encounter: 1.74 m (5' 8.5\").    Weight as of this encounter: 93 kg (205 lb).     Weight management plan: continue to lose weight by diet change     reports that he has never smoked. He has never used smokeless tobacco.      Counseling Resources:  ATP IV Guidelines  Pooled Cohorts Equation Calculator  FRAX Risk Assessment  ICSI Preventive Guidelines  Dietary Guidelines for Americans, 2010  USDA's MyPlate    ASA Prophylaxis  Lung CA Screening    Gerald Mckeon MD  [facility]  "

## 2019-07-11 ENCOUNTER — TRANSFERRED RECORDS (OUTPATIENT)
Dept: HEALTH INFORMATION MANAGEMENT | Facility: CLINIC | Age: 49
End: 2019-07-11

## 2019-11-07 ENCOUNTER — HEALTH MAINTENANCE LETTER (OUTPATIENT)
Age: 49
End: 2019-11-07

## 2020-05-15 ENCOUNTER — MYC REFILL (OUTPATIENT)
Dept: FAMILY MEDICINE | Facility: CLINIC | Age: 50
End: 2020-05-15

## 2020-05-15 ENCOUNTER — MYC MEDICAL ADVICE (OUTPATIENT)
Dept: FAMILY MEDICINE | Facility: CLINIC | Age: 50
End: 2020-05-15

## 2020-05-15 DIAGNOSIS — G44.1 VASCULAR HEADACHE: ICD-10-CM

## 2020-05-15 DIAGNOSIS — R68.89 FLU-LIKE SYMPTOMS: Primary | ICD-10-CM

## 2020-05-15 RX ORDER — RIZATRIPTAN BENZOATE 10 MG/1
TABLET ORAL
Qty: 30 TABLET | Refills: 0 | Status: SHIPPED | OUTPATIENT
Start: 2020-05-15 | End: 2021-05-28

## 2020-05-18 DIAGNOSIS — R68.89 FLU-LIKE SYMPTOMS: ICD-10-CM

## 2020-05-18 LAB
COVID-19 SPIKE RBD ABY TITER: NORMAL
COVID-19 SPIKE RBD ABY: NEGATIVE

## 2020-05-18 PROCEDURE — 86769 SARS-COV-2 COVID-19 ANTIBODY: CPT | Mod: 90 | Performed by: FAMILY MEDICINE

## 2020-05-18 PROCEDURE — 99000 SPECIMEN HANDLING OFFICE-LAB: CPT | Performed by: FAMILY MEDICINE

## 2020-05-18 PROCEDURE — 36415 COLL VENOUS BLD VENIPUNCTURE: CPT | Performed by: FAMILY MEDICINE

## 2020-11-29 ENCOUNTER — HEALTH MAINTENANCE LETTER (OUTPATIENT)
Age: 50
End: 2020-11-29

## 2021-05-06 DIAGNOSIS — G44.1 VASCULAR HEADACHE: ICD-10-CM

## 2021-05-07 ENCOUNTER — MYC REFILL (OUTPATIENT)
Dept: FAMILY MEDICINE | Facility: CLINIC | Age: 51
End: 2021-05-07

## 2021-05-07 DIAGNOSIS — G44.1 VASCULAR HEADACHE: ICD-10-CM

## 2021-05-07 RX ORDER — RIZATRIPTAN BENZOATE 10 MG/1
TABLET ORAL
Qty: 30 TABLET | Refills: 0 | OUTPATIENT
Start: 2021-05-07

## 2021-05-07 NOTE — TELEPHONE ENCOUNTER
Routing refill request to provider for review/approval because:  Patient needs to be seen because it has been more than 1 year since last office visit.  Last office visit 5/30/19.   Serontonin Agonist.  BP Readings from Last 6 Encounters:   05/30/19 113/75   10/19/18 114/75   08/08/18 124/76   06/06/17 119/78   05/25/17 119/83   05/18/17 119/86     Marie Summers RN

## 2021-05-07 NOTE — TELEPHONE ENCOUNTER
"Requested Prescriptions   Pending Prescriptions Disp Refills     rizatriptan (MAXALT) 10 MG tablet 30 tablet 0     Sig: TAKE ONE TABLET BY MOUTH AT ONSET OF HEADACHE FOR MIGRAINE, MAY REPEAT DOSE AFTER 2 HOURS IF NEEDED.       Serotonin Agonists Failed - 5/7/2021 11:52 AM        Failed - Blood pressure under 140/90 in past 12 months     BP Readings from Last 3 Encounters:   05/30/19 113/75   10/19/18 114/75   08/08/18 124/76                 Failed - Serotonin Agonist request needs review.     Please review patient's record. If patient has had 8 or more treatments in the past month, please forward to provider.          Failed - Recent (12 mo) or future (30 days) visit within the authorizing provider's specialty     Patient has had an office visit with the authorizing provider or a provider within the authorizing providers department within the previous 12 mos or has a future within next 30 days. See \"Patient Info\" tab in inbasket, or \"Choose Columns\" in Meds & Orders section of the refill encounter.              Passed - Medication is active on med list        Passed - Patient is age 18 or older           Sybil MURRYN, RN    "

## 2021-05-08 ENCOUNTER — MYC MEDICAL ADVICE (OUTPATIENT)
Dept: FAMILY MEDICINE | Facility: CLINIC | Age: 51
End: 2021-05-08

## 2021-05-08 DIAGNOSIS — E78.5 HYPERLIPIDEMIA LDL GOAL <130: Primary | ICD-10-CM

## 2021-05-08 DIAGNOSIS — Z00.00 ROUTINE GENERAL MEDICAL EXAMINATION AT A HEALTH CARE FACILITY: ICD-10-CM

## 2021-05-08 DIAGNOSIS — Z12.5 SCREENING FOR PROSTATE CANCER: ICD-10-CM

## 2021-05-08 RX ORDER — RIZATRIPTAN BENZOATE 10 MG/1
TABLET ORAL
Qty: 30 TABLET | Refills: 0 | OUTPATIENT
Start: 2021-05-08

## 2021-05-10 NOTE — TELEPHONE ENCOUNTER
Called the patient and informed him that ALMITA Cuellar placed the lab orders.    Provider, yes, he would like to do the PSA lab. Please enter the order. Lab appt. Is 5/15/2021.  Thank you,  Pura Mak TC

## 2021-05-10 NOTE — TELEPHONE ENCOUNTER
"To Provider,  Labs pended per patient request. Physical/ wellness on 5/28/2021.     Patient note :  \"annual checkup and Rx renewal for rizatriptan\".     Please review the medication lab request and add if needed.    Please review lab orders sign and close encounter.  Pura Mak TC  "

## 2021-05-10 NOTE — TELEPHONE ENCOUNTER
I ordered them. We could do a psa (prostate screening) blood test as well if he agrees. If unsure we can discuss at appointment.  If he wants it, route back to me and I will order.     Abbey Cuellar PA-C

## 2021-05-15 DIAGNOSIS — Z12.5 SCREENING FOR PROSTATE CANCER: ICD-10-CM

## 2021-05-15 DIAGNOSIS — E78.5 HYPERLIPIDEMIA LDL GOAL <130: ICD-10-CM

## 2021-05-15 PROCEDURE — 80061 LIPID PANEL: CPT | Performed by: PHYSICIAN ASSISTANT

## 2021-05-15 PROCEDURE — 80053 COMPREHEN METABOLIC PANEL: CPT | Performed by: PHYSICIAN ASSISTANT

## 2021-05-15 PROCEDURE — 36415 COLL VENOUS BLD VENIPUNCTURE: CPT | Performed by: PHYSICIAN ASSISTANT

## 2021-05-15 PROCEDURE — G0103 PSA SCREENING: HCPCS | Performed by: PHYSICIAN ASSISTANT

## 2021-05-17 LAB
ALBUMIN SERPL-MCNC: 4.3 G/DL (ref 3.4–5)
ALP SERPL-CCNC: 45 U/L (ref 40–150)
ALT SERPL W P-5'-P-CCNC: 51 U/L (ref 0–70)
ANION GAP SERPL CALCULATED.3IONS-SCNC: 6 MMOL/L (ref 3–14)
AST SERPL W P-5'-P-CCNC: 20 U/L (ref 0–45)
BILIRUB SERPL-MCNC: 0.5 MG/DL (ref 0.2–1.3)
BUN SERPL-MCNC: 13 MG/DL (ref 7–30)
CALCIUM SERPL-MCNC: 8.8 MG/DL (ref 8.5–10.1)
CHLORIDE SERPL-SCNC: 105 MMOL/L (ref 94–109)
CHOLEST SERPL-MCNC: 273 MG/DL
CO2 SERPL-SCNC: 27 MMOL/L (ref 20–32)
CREAT SERPL-MCNC: 1.09 MG/DL (ref 0.66–1.25)
GFR SERPL CREATININE-BSD FRML MDRD: 77 ML/MIN/{1.73_M2}
GLUCOSE SERPL-MCNC: 90 MG/DL (ref 70–99)
HDLC SERPL-MCNC: 45 MG/DL
LDLC SERPL CALC-MCNC: 186 MG/DL
NONHDLC SERPL-MCNC: 228 MG/DL
POTASSIUM SERPL-SCNC: 4.3 MMOL/L (ref 3.4–5.3)
PROT SERPL-MCNC: 7.6 G/DL (ref 6.8–8.8)
PSA SERPL-ACNC: 1.08 UG/L (ref 0–4)
SODIUM SERPL-SCNC: 138 MMOL/L (ref 133–144)
TRIGL SERPL-MCNC: 210 MG/DL

## 2021-05-17 NOTE — RESULT ENCOUNTER NOTE
Lorenzo Faith,       Your recent test results are attached, if you have any questions or concerns please feel free to contact me via e-mail or call [phone].  We will discuss at upcoming appointment.        It was a pleasure to see you at your recent office visit.      Sincerely,  Abbey Cuellar PA-C

## 2021-05-28 DIAGNOSIS — G44.1 VASCULAR HEADACHE: ICD-10-CM

## 2021-05-28 RX ORDER — RIZATRIPTAN BENZOATE 10 MG/1
TABLET ORAL
Qty: 30 TABLET | Refills: 0 | Status: SHIPPED | OUTPATIENT
Start: 2021-05-28 | End: 2021-06-04

## 2021-05-28 ASSESSMENT — ENCOUNTER SYMPTOMS
CHILLS: 0
NERVOUS/ANXIOUS: 0
SORE THROAT: 0
FEVER: 0
HEMATURIA: 0
ARTHRALGIAS: 1
HEMATOCHEZIA: 0
ABDOMINAL PAIN: 0
NAUSEA: 0
PALPITATIONS: 0
WEAKNESS: 0
PARESTHESIAS: 0
EYE PAIN: 0
DIARRHEA: 0
MYALGIAS: 0
HEADACHES: 1
CONSTIPATION: 0
FREQUENCY: 0
DIZZINESS: 0
HEARTBURN: 1
SHORTNESS OF BREATH: 0
COUGH: 0
DYSURIA: 0
JOINT SWELLING: 0

## 2021-05-28 NOTE — TELEPHONE ENCOUNTER
"Routing refill request to provider for review/approval because:  Requested Prescriptions   Pending Prescriptions Disp Refills    rizatriptan (MAXALT) 10 MG tablet 30 tablet 0     Sig: TAKE ONE TABLET BY MOUTH AT ONSET OF HEADACHE FOR MIGRAINE, MAY REPEAT DOSE AFTER 2 HOURS IF NEEDED.       Serotonin Agonists Failed - 5/28/2021  2:14 PM        Failed - Blood pressure under 140/90 in past 12 months     BP Readings from Last 3 Encounters:   05/30/19 113/75   10/19/18 114/75   08/08/18 124/76                 Failed - Serotonin Agonist request needs review.     Please review patient's record. If patient has had 8 or more treatments in the past month, please forward to provider.          Passed - Recent (12 mo) or future (30 days) visit within the authorizing provider's specialty     Patient has had an office visit with the authorizing provider or a provider within the authorizing providers department within the previous 12 mos or has a future within next 30 days. See \"Patient Info\" tab in inbasket, or \"Choose Columns\" in Meds & Orders section of the refill encounter.              Passed - Medication is active on med list        Passed - Patient is age 18 or older               Pepper HUGHES, RN          "

## 2021-05-28 NOTE — TELEPHONE ENCOUNTER
Patient was on Abbey Cuellar's schedule today. Abbey will not be in clinic. He rescheduled for next week but needs a refill on his Maxalt before then.    Mar Nathan, CMA

## 2021-06-02 NOTE — PROGRESS NOTES
3  SUBJECTIVE:   CC: Marcell Rodriguez is an 50 year old male who presents for preventive health visit.     Patient has been advised of split billing requirements and indicates understanding: Yes     Healthy Habits:    Pt already had labs completed.    bmi 31.     Works in IT.         LDL Cholesterol Calculated   Date Value Ref Range Status   05/15/2021 186 (H) <100 mg/dL Final     Comment:     Above desirable:  100-129 mg/dl  Borderline High:  130-159 mg/dL  High:             160-189 mg/dL  Very high:       >189 mg/dl       Cholesterol high. Previous medication?   2013 prescribed 20 mg simvastatin. Was on previously.  Does not want to be on cholesterol lowering medication, we discussed in detail today. He is aware of risks/benefits.     Had high cholesterol when he was 16.   Dad had high cholesterol.       The 10-year ASCVD risk score (Mario MUKHERJEE Jr., et al., 2013) is: 6.5%    Values used to calculate the score:      Age: 50 years      Sex: Male      Is Non- : No      Diabetic: No      Tobacco smoker: No      Systolic Blood Pressure: 123 mmHg      Is BP treated: No      HDL Cholesterol: 45 mg/dL      Total Cholesterol: 273 mg/dL      Answers for HPI/ROS submitted by the patient on 5/28/2021   Annual Exam:  Frequency of exercise:: 2-3 days/week  Getting at least 3 servings of Calcium per day:: Yes  Diet:: Regular (no restrictions)  Taking medications regularly:: Yes  Medication side effects:: Not applicable, Other  Bi-annual eye exam:: NO  Dental care twice a year:: Yes  Sleep apnea or symptoms of sleep apnea:: Excessive snoring  abdominal pain: No  Blood in stool: No  Blood in urine: No  chest pain: No  chills: No  congestion: No  constipation: No  cough: No  diarrhea: No  dizziness: No  ear pain: No  eye pain: No  nervous/anxious: No  fever: No  frequency: No  genital sores: No  headaches: Yes  hearing loss: No  heartburn: Yes  arthralgias: Yes  joint swelling: No  peripheral edema: No  mood  changes: No  myalgias: No  nausea: No  dysuria: No  palpitations: No  Skin sensation changes: No  sore throat: No  urgency: No  rash: No  shortness of breath: No  visual disturbance: No  weakness: No  impotence: No  penile discharge: No  Additional concerns today:: YES  -refill for rizatriptan med.  Varies how much he needs. Can go two months without, sometimes needs days in a row.  maxalt works about 80 percent of the time.       Duration of exercise:: 15-30 minutes    H/o BCC. Going to schedule with derm for another checkup. Has lesion on [facility] ear.     Today's PHQ-2 Score:   PHQ-2 ( 1999 Pfizer) 5/28/2021 5/30/2019   Q1: Little interest or pleasure in doing things 0 0   Q2: Feeling down, depressed or hopeless 0 0   PHQ-2 Score 0 0   Q1: Little interest or pleasure in doing things Not at all Not at all   Q2: Feeling down, depressed or hopeless Not at all Not at all   PHQ-2 Score 0 0       Abuse: Current or Past(Physical, Sexual or Emotional)- No  Do you feel safe in your environment? Yes        Social History     Tobacco Use     Smoking status: Never Smoker     Smokeless tobacco: Never Used     Tobacco comment: Lives with a smoker, smokes outside   Substance Use Topics     Alcohol use: No     If you drink alcohol do you typically have >3 drinks per day or >7 drinks per week? No                      Last PSA:   PSA   Date Value Ref Range Status   05/15/2021 1.08 0 - 4 ug/L Final     Comment:     Assay Method:  Chemiluminescence using Siemens Vista analyzer       Reviewed orders with patient. Reviewed health maintenance and updated orders accordingly - Yes  Lab work is in process  Labs reviewed in EPIC  BP Readings from Last 3 Encounters:   06/04/21 123/82   05/30/19 113/75   10/19/18 114/75    Wt Readings from Last 3 Encounters:   06/04/21 96.2 kg (212 lb)   05/30/19 93 kg (205 lb)   10/19/18 102.1 kg (225 lb)                  Patient Active Problem List   Diagnosis     Vascular headaches     High cholesterol      Encounter for other general counseling or advice on contraception     Shoulder strain     Hyperlipidemia     Hyperlipidemia LDL goal <130     GERD (gastroesophageal reflux disease)     Irritable bowel syndrome with diarrhea     BCC (basal cell carcinoma), face     Past Surgical History:   Procedure Laterality Date     ABDOMEN SURGERY  11/9/2015    CT Scan, found 1.6 CM homogeneous cyst on kidney     BACK SURGERY  6550-4245    Neck injuries     BIOPSY       COLONOSCOPY  11/14/2015    [facility], no abnormalities     ENT SURGERY       GI SURGERY  2012, 10/2015    Gluten intolerant, IBD like symptoms     HEAD & NECK SURGERY  [id number]    Neck injuries     ORTHOPEDIC SURGERY  2013    Knee     VASECTOMY         Social History     Tobacco Use     Smoking status: Never Smoker     Smokeless tobacco: Never Used     Tobacco comment: Lives with a smoker, smokes outside   Substance Use Topics     Alcohol use: No     Family History   Adopted: Yes   Problem Relation Age of Onset     Unknown/Adopted Other         patient is adopted, no family hx,     Unknown/Adopted Mother      Coronary Artery Disease Mother         DVT death in 2017.     Hyperlipidemia Mother         High cholesterol     Irritable Bowel Syndrome Mother      Migraines Mother      Unknown/Adopted Maternal Grandmother      Unknown/Adopted Maternal Grandfather      Hyperlipidemia Maternal Grandfather         High cholesterol     Unknown/Adopted Paternal Grandmother      Unknown/Adopted Paternal Grandfather      Unknown/Adopted Brother      Unknown/Adopted Father      Unknown/Adopted Other      Cancer No family hx of      Glaucoma No family hx of      Macular Degeneration No family hx of      Diabetes No family hx of      Hypertension No family hx of      Cerebrovascular Disease No family hx of      Thyroid Disease No family hx of          Current Outpatient Medications   Medication Sig Dispense Refill     rizatriptan (MAXALT) 10 MG tablet TAKE ONE TABLET BY MOUTH AT  "ONSET OF HEADACHE FOR MIGRAINE, MAY REPEAT DOSE AFTER 2 HOURS IF NEEDED. 30 tablet 5     Allergies   Allergen Reactions     Gluten Meal      Latex        Reviewed and updated as needed this visit by clinical staff                 Reviewed and updated as needed this visit by Provider                Past Medical History:   Diagnosis Date     Arthritis 1999    Neck     BCC (basal cell carcinoma), face 2009     High cholesterol      Hyperlipidemia      Other general counseling and advice for contraceptive management      Shoulder strain      Vascular headaches      Vascular headaches      Vascular headaches      Vascular headaches       Past Surgical History:   Procedure Laterality Date     ABDOMEN SURGERY  11/9/2015    CT Scan, found 1.6 CM homogeneous cyst on kidney     BACK SURGERY  [id number]    Neck injuries     BIOPSY       COLONOSCOPY  11/14/2015    [facility], no abnormalities     ENT SURGERY       GI SURGERY  2012, 10/2015    Gluten intolerant, IBD like symptoms     HEAD & NECK SURGERY  [id number]    Neck injuries     ORTHOPEDIC SURGERY  2013    Knee     VASECTOMY         ROS:  CONSTITUTIONAL: NEGATIVE for fever, chills, change in weight  INTEGUMENTARY/SKIN: NEGATIVE for worrisome rashes, moles or lesions  EYES: NEGATIVE for vision changes or irritation  ENT: NEGATIVE for ear, mouth and throat problems  RESP: NEGATIVE for significant cough or SOB  CV: NEGATIVE for chest pain, palpitations or peripheral edema  GI: NEGATIVE for nausea, abdominal pain, heartburn, or change in bowel habits   male: negative for dysuria, hematuria, decreased urinary stream, erectile dysfunction, urethral discharge  MUSCULOSKELETAL: NEGATIVE for significant arthralgias or myalgia  NEURO: NEGATIVE for weakness, dizziness or paresthesias  PSYCHIATRIC: NEGATIVE for changes in mood or affect    OBJECTIVE:   /82   Pulse 69   Temp 97.6  F (36.4  C) (Tympanic)   Resp 16   Ht 1.753 m (5' 9\")   Wt 96.2 kg (212 lb)   SpO2 98%   BMI " "31.31 kg/m    EXAM:  GENERAL: alert, no distress and over weight  EYES: Eyes grossly normal to inspection, PERRL and conjunctivae and sclerae normal  HENT: ear canals and TM's normal, nose and mouth without ulcers or lesions  NECK: no adenopathy, no asymmetry, masses, or scars and thyroid normal to palpation  RESP: lungs clear to auscultation - no rales, rhonchi or wheezes  CV: regular rate and rhythm, normal S1 S2, no S3 or S4, no murmur, click or rub, no peripheral edema and peripheral pulses strong  MS: no gross musculoskeletal defects noted, no edema  SKIN: no suspicious lesions or rashes  NEURO: Normal strength and tone, mentation intact and speech normal  PSYCH: mentation appears normal, affect normal/bright        ASSESSMENT/PLAN:   1. Routine general medical examination at a health care facility      2. Vascular headaches  Stable  To neurology if symptoms worsen/change  - rizatriptan (MAXALT) 10 MG tablet; TAKE ONE TABLET BY MOUTH AT ONSET OF HEADACHE FOR MIGRAINE, MAY REPEAT DOSE AFTER 2 HOURS IF NEEDED.  Dispense: 30 tablet; Refill: 5    3. BCC (basal cell carcinoma), face  History of this    Continue to follow with derm      Patient has been advised of split billing requirements and indicates understanding: Yes  COUNSELING:  Reviewed preventive health counseling, as reflected in patient instructions       Regular exercise       Healthy diet/nutrition    Estimated body mass index is 30.72 kg/m  as calculated from the following:    Height as of 5/30/19: 1.74 m (5' 8.5\").    Weight as of 5/30/19: 93 kg (205 lb).    Weight management plan: Discussed healthy diet and exercise guidelines    He reports that he has never smoked. He has never used smokeless tobacco.      Counseling Resources:  ATP IV Guidelines  Pooled Cohorts Equation Calculator  FRAX Risk Assessment  ICSI Preventive Guidelines  Dietary Guidelines for Americans, 2010  USDA's MyPlate  ASA Prophylaxis  Lung CA Screening    Abbey Cuellar, " JUNE FOSTER [facility]

## 2021-06-04 ENCOUNTER — OFFICE VISIT (OUTPATIENT)
Dept: FAMILY MEDICINE | Facility: CLINIC | Age: 51
End: 2021-06-04
Payer: COMMERCIAL

## 2021-06-04 VITALS
WEIGHT: 212 LBS | OXYGEN SATURATION: 98 % | RESPIRATION RATE: 16 BRPM | TEMPERATURE: 97.6 F | SYSTOLIC BLOOD PRESSURE: 123 MMHG | HEART RATE: 69 BPM | BODY MASS INDEX: 31.4 KG/M2 | DIASTOLIC BLOOD PRESSURE: 82 MMHG | HEIGHT: 69 IN

## 2021-06-04 DIAGNOSIS — C44.310 BCC (BASAL CELL CARCINOMA), FACE: ICD-10-CM

## 2021-06-04 DIAGNOSIS — Z00.00 ROUTINE GENERAL MEDICAL EXAMINATION AT A HEALTH CARE FACILITY: Primary | ICD-10-CM

## 2021-06-04 DIAGNOSIS — G44.1 VASCULAR HEADACHE: ICD-10-CM

## 2021-06-04 PROCEDURE — 99396 PREV VISIT EST AGE 40-64: CPT | Performed by: PHYSICIAN ASSISTANT

## 2021-06-04 PROCEDURE — 99213 OFFICE O/P EST LOW 20 MIN: CPT | Mod: 25 | Performed by: PHYSICIAN ASSISTANT

## 2021-06-04 RX ORDER — RIZATRIPTAN BENZOATE 10 MG/1
TABLET ORAL
Qty: 30 TABLET | Refills: 5 | Status: SHIPPED | OUTPATIENT
Start: 2021-06-04 | End: 2021-11-04

## 2021-06-04 ASSESSMENT — MIFFLIN-ST. JEOR: SCORE: 1802.01

## 2021-09-25 ENCOUNTER — HEALTH MAINTENANCE LETTER (OUTPATIENT)
Age: 51
End: 2021-09-25

## 2021-11-04 DIAGNOSIS — G44.1 VASCULAR HEADACHE: ICD-10-CM

## 2021-11-04 RX ORDER — RIZATRIPTAN BENZOATE 10 MG/1
TABLET ORAL
Qty: 30 TABLET | Refills: 5 | Status: SHIPPED | OUTPATIENT
Start: 2021-11-04 | End: 2022-07-14

## 2021-11-04 NOTE — TELEPHONE ENCOUNTER
"Requested Prescriptions   Pending Prescriptions Disp Refills    rizatriptan (MAXALT) 10 MG tablet [Pharmacy Med Name: RIZATRIPTAN 10MG TABLETS] 30 tablet 5     Sig: TAKE 1 TABLET BY MOUTH AT ONSET OF HEADACHE FOR MIGRAINE. MAY REPEAT DOSE AFTER 2 HOURS AS NEEDED        Serotonin Agonists Failed - 11/4/2021  4:02 AM        Failed - Serotonin Agonist request needs review.     Please review patient's record. If patient has had 8 or more treatments in the past month, please forward to provider.          Passed - Blood pressure under 140/90 in past 12 months       BP Readings from Last 3 Encounters:   06/04/21 123/82   05/30/19 113/75   10/19/18 114/75                 Passed - Recent (12 mo) or future (30 days) visit within the authorizing provider's specialty     Patient has had an office visit with the authorizing provider or a provider within the authorizing providers department within the previous 12 mos or has a future within next 30 days. See \"Patient Info\" tab in inbasket, or \"Choose Columns\" in Meds & Orders section of the refill encounter.              Passed - Medication is active on med list        Passed - Patient is age 18 or older                  Pepper HUGHES, RN    "

## 2022-01-20 ENCOUNTER — OFFICE VISIT (OUTPATIENT)
Dept: FAMILY MEDICINE | Facility: CLINIC | Age: 52
End: 2022-01-20
Payer: COMMERCIAL

## 2022-01-20 VITALS
BODY MASS INDEX: 31.99 KG/M2 | HEART RATE: 75 BPM | DIASTOLIC BLOOD PRESSURE: 62 MMHG | HEIGHT: 69 IN | RESPIRATION RATE: 15 BRPM | SYSTOLIC BLOOD PRESSURE: 99 MMHG | OXYGEN SATURATION: 97 % | WEIGHT: 216 LBS | TEMPERATURE: 98.6 F

## 2022-01-20 DIAGNOSIS — M79.674 PAIN OF TOE OF RIGHT FOOT: Primary | ICD-10-CM

## 2022-01-20 DIAGNOSIS — Z97.3 WEARS GLASSES: ICD-10-CM

## 2022-01-20 PROCEDURE — 90715 TDAP VACCINE 7 YRS/> IM: CPT | Performed by: FAMILY MEDICINE

## 2022-01-20 PROCEDURE — 99213 OFFICE O/P EST LOW 20 MIN: CPT | Mod: 25 | Performed by: FAMILY MEDICINE

## 2022-01-20 PROCEDURE — 90471 IMMUNIZATION ADMIN: CPT | Performed by: FAMILY MEDICINE

## 2022-01-20 ASSESSMENT — MIFFLIN-ST. JEOR: SCORE: 1815.15

## 2022-01-20 ASSESSMENT — PAIN SCALES - GENERAL: PAINLEVEL: NO PAIN (0)

## 2022-01-20 NOTE — NURSING NOTE
Prior to immunization administration, verified patients identity using patient s name and date of birth. Please see Immunization Activity for additional information.     Screening Questionnaire for Adult Immunization    Are you sick today?   No   Do you have allergies to medications, food, a vaccine component or latex?   No   Have you ever had a serious reaction after receiving a vaccination?   No   Do you have a long-term health problem with heart, lung, kidney, or metabolic disease (e.g., diabetes), asthma, a blood disorder, no spleen, complement component deficiency, a cochlear implant, or a spinal fluid leak?  Are you on long-term aspirin therapy?   No   Do you have cancer, leukemia, HIV/AIDS, or any other immune system problem?   No   Do you have a parent, brother, or sister with an immune system problem?   No   In the past 3 months, have you taken medications that affect  your immune system, such as prednisone, other steroids, or anticancer drugs; drugs for the treatment of rheumatoid arthritis, Crohn s disease, or psoriasis; or have you had radiation treatments?   No   Have you had a seizure, or a brain or other nervous system problem?   No   During the past year, have you received a transfusion of blood or blood    products, or been given immune (gamma) globulin or antiviral drug?   No   For women: Are you pregnant or is there a chance you could become       pregnant during the next month?   No   Have you received any vaccinations in the past 4 weeks?   No     Immunization questionnaire answers were all negative.        Per orders of Dr. Mcdaniel, injection of tdap given by Nguyen Pineda MA. Patient instructed to remain in clinic for 15 minutes afterwards, and to report any adverse reaction to me immediately.       Screening performed by Nguyen Pineda MA on 1/20/2022 at 9:02 AM.

## 2022-01-20 NOTE — PROGRESS NOTES
"  Assessment & Plan     Pain of toe of right foot  Soaking yesterday seems to have improved toepain and swelling a lot. Continue current cares. Clip edge of toenail as it grows    Wears glasses    - Adult Eye Referral; Future             BMI:   Estimated body mass index is 31.9 kg/m  as calculated from the following:    Height as of this encounter: 1.753 m (5' 9\").    Weight as of this encounter: 98 kg (216 lb).   Weight management plan: Discussed healthy diet and exercise guidelines        No follow-ups on file.    Kaylee Nelson MD  [facility]   Marcell is a 51 year old who     presents for the following health issues     History of Present Illness       He eats 2-3 servings of fruits and vegetables daily.He consumes 0 sweetened beverage(s) daily.He exercises with enough effort to increase his heart rate 10 to 19 minutes per day.  He exercises with enough effort to increase his heart rate 3 or less days per week.   He is taking medications regularly.       Concern - toe on left foot (middle)  Onset: 6 days   Description: puple and tender to touch   Intensity: moderate  Progression of Symptoms:  improving  Accompanying Signs & Symptoms: swelling   Previous history of similar problem:   Precipitating factors:        Worsened by: pressure   Alleviating factors:        Improved by: not applying pressure   Therapies tried and outcome: soaking in warm water      Pt hit toe on dog toy 6 days. It was middle toe right foot. As of yesterday it was swollen purple and painful  He soaked it a lot yesterday as he worked from home. It is looking much better today.    Review of Systems   Constitutional, HEENT, cardiovascular, pulmonary, gi and gu systems are negative, except as otherwise noted.      Objective    BP 99/62   Pulse 75   Temp 98.6  F (37  C) (Oral)   Resp 15   Ht 1.753 m (5' 9\")   Wt 98 kg (216 lb)   SpO2 97%   BMI 31.90 kg/m    Body mass index is 31.9 " kg/m .  Physical Exam   GENERAL: healthy, alert and no distress  MS: right middle toe with broken toenail, mild erythema along lateral nailbed, mild tenderness to palpation  No signs of infection    No results found for this or any previous visit (from the past 24 hour(s)).

## 2022-03-06 ENCOUNTER — HEALTH MAINTENANCE LETTER (OUTPATIENT)
Age: 52
End: 2022-03-06

## 2022-06-20 ENCOUNTER — DOCUMENTATION ONLY (OUTPATIENT)
Dept: LAB | Facility: CLINIC | Age: 52
End: 2022-06-20
Payer: COMMERCIAL

## 2022-06-20 ENCOUNTER — MYC MEDICAL ADVICE (OUTPATIENT)
Dept: FAMILY MEDICINE | Facility: CLINIC | Age: 52
End: 2022-06-20
Payer: COMMERCIAL

## 2022-06-20 DIAGNOSIS — Z12.5 SCREENING FOR PROSTATE CANCER: Primary | ICD-10-CM

## 2022-06-20 DIAGNOSIS — Z13.1 SCREENING FOR DIABETES MELLITUS: ICD-10-CM

## 2022-06-20 DIAGNOSIS — Z13.220 SCREENING, LIPID: ICD-10-CM

## 2022-06-25 ENCOUNTER — LAB (OUTPATIENT)
Dept: LAB | Facility: CLINIC | Age: 52
End: 2022-06-25
Payer: COMMERCIAL

## 2022-06-25 DIAGNOSIS — Z13.220 SCREENING, LIPID: ICD-10-CM

## 2022-06-25 DIAGNOSIS — Z13.1 SCREENING FOR DIABETES MELLITUS: ICD-10-CM

## 2022-06-25 DIAGNOSIS — Z12.5 SCREENING FOR PROSTATE CANCER: ICD-10-CM

## 2022-06-25 PROCEDURE — 80053 COMPREHEN METABOLIC PANEL: CPT

## 2022-06-25 PROCEDURE — G0103 PSA SCREENING: HCPCS

## 2022-06-25 PROCEDURE — 36415 COLL VENOUS BLD VENIPUNCTURE: CPT

## 2022-06-25 PROCEDURE — 80061 LIPID PANEL: CPT

## 2022-06-27 LAB
ALBUMIN SERPL-MCNC: 3.7 G/DL (ref 3.4–5)
ALP SERPL-CCNC: 39 U/L (ref 40–150)
ALT SERPL W P-5'-P-CCNC: 26 U/L (ref 0–70)
ANION GAP SERPL CALCULATED.3IONS-SCNC: 4 MMOL/L (ref 3–14)
AST SERPL W P-5'-P-CCNC: 17 U/L (ref 0–45)
BILIRUB SERPL-MCNC: 0.6 MG/DL (ref 0.2–1.3)
BUN SERPL-MCNC: 11 MG/DL (ref 7–30)
CALCIUM SERPL-MCNC: 8.7 MG/DL (ref 8.5–10.1)
CHLORIDE BLD-SCNC: 111 MMOL/L (ref 94–109)
CHOLEST SERPL-MCNC: 235 MG/DL
CO2 SERPL-SCNC: 26 MMOL/L (ref 20–32)
CREAT SERPL-MCNC: 0.99 MG/DL (ref 0.66–1.25)
FASTING STATUS PATIENT QL REPORTED: YES
GFR SERPL CREATININE-BSD FRML MDRD: >90 ML/MIN/1.73M2
GLUCOSE BLD-MCNC: 89 MG/DL (ref 70–99)
HDLC SERPL-MCNC: 44 MG/DL
LDLC SERPL CALC-MCNC: 160 MG/DL
NONHDLC SERPL-MCNC: 191 MG/DL
POTASSIUM BLD-SCNC: 4.1 MMOL/L (ref 3.4–5.3)
PROT SERPL-MCNC: 6.8 G/DL (ref 6.8–8.8)
PSA SERPL-MCNC: 0.84 UG/L (ref 0–4)
SODIUM SERPL-SCNC: 141 MMOL/L (ref 133–144)
TRIGL SERPL-MCNC: 156 MG/DL

## 2022-06-28 NOTE — RESULT ENCOUNTER NOTE
Lorenzo Faith,       Your recent test results are attached, if you have any questions or concerns please feel free to contact me via e-mail or call [phone].  Prostate screening and liver test normal.  Kidney function normal.  Cholesterol slightly improved.       It was a pleasure to see you at your recent office visit.      Sincerely,  Abbey Cuellar PA-C

## 2022-07-06 NOTE — PROGRESS NOTES
SUBJECTIVE:   CC: Marcell Rodriguez is an 51 year old male who presents for preventative health visit.     Patient has been advised of split billing requirements and indicates understanding: Yes     Pt already had labs completed.      ldl 160. Declined previously. Risk score today is borderline as below.     The 10-year ASCVD risk score (Mario MUKHERJEE Jr., et al., 2013) is: 6.1%    Values used to calculate the score:      Age: 51 years      Sex: Male      Is Non- : No      Diabetic: No      Tobacco smoker: No      Systolic Blood Pressure: 125 mmHg      Is BP treated: No      HDL Cholesterol: 44 mg/dL      Total Cholesterol: 235 mg/dL    -  Med check and refill.      Headaches-maxalt helps. Uses this monthly or sometimes more.   No side effects. stable    Cholesterol overall improved.     H/o skin cancer twice on face and ear, follows with associated skin care for this.         Healthy Habits:     Getting at least 3 servings of Calcium per day:  Yes    Bi-annual eye exam:  Yes    Dental care twice a year:  Yes    Sleep apnea or symptoms of sleep apnea:  Excessive snoring    Diet:  Regular (no restrictions)    Frequency of exercise:  6-7 days/week    Duration of exercise:  45-60 minutes    Taking medications regularly:  Yes    Medication side effects:  None    PHQ-2 Total Score: 0    Additional concerns today:  No           Today's PHQ-2 Score:   PHQ-2 ( 1999 Pfizer) 7/7/2022   Q1: Little interest or pleasure in doing things 0   Q2: Feeling down, depressed or hopeless 0   PHQ-2 Score 0   PHQ-2 Total Score (12-17 Years)- Positive if 3 or more points; Administer PHQ-A if positive -   Q1: Little interest or pleasure in doing things Not at all   Q2: Feeling down, depressed or hopeless Not at all   PHQ-2 Score 0       Abuse: Current or Past(Physical, Sexual or Emotional)- No  Do you feel safe in your environment? Yes        Social History     Tobacco Use     Smoking status: Never Smoker     Smokeless  tobacco: Never Used     Tobacco comment: Lives with a smoker, smokes outside   Substance Use Topics     Alcohol use: No     If you drink alcohol do you typically have >3 drinks per day or >7 drinks per week? No      Last PSA:   PSA   Date Value Ref Range Status   05/15/2021 1.08 0 - 4 ug/L Final     Comment:     Assay Method:  Chemiluminescence using Siemens Vista analyzer     Prostate Specific Antigen Screen   Date Value Ref Range Status   06/25/2022 0.84 0.00 - 4.00 ug/L Final       Reviewed orders with patient. Reviewed health maintenance and updated orders accordingly - Yes  Lab work is in process  Labs reviewed in EPIC  BP Readings from Last 3 Encounters:   07/14/22 125/79   01/20/22 99/62   06/04/21 123/82    Wt Readings from Last 3 Encounters:   07/14/22 92.1 kg (203 lb)   01/20/22 98 kg (216 lb)   06/04/21 96.2 kg (212 lb)                  Patient Active Problem List   Diagnosis     Vascular headaches     High cholesterol     Encounter for other general counseling or advice on contraception     Shoulder strain     Hyperlipidemia     Hyperlipidemia LDL goal <130     GERD (gastroesophageal reflux disease)     Irritable bowel syndrome with diarrhea     BCC (basal cell carcinoma), face     Past Surgical History:   Procedure Laterality Date     ABDOMEN SURGERY  11/9/2015    CT Scan, found 1.6 CM homogeneous cyst on kidney     BACK SURGERY  [id number]    Neck injuries     BIOPSY       COLONOSCOPY  11/14/2015    [facility], no abnormalities     ENT SURGERY       GI SURGERY  2012, 10/2015    Gluten intolerant, IBD like symptoms     HEAD & NECK SURGERY  [id number]    Neck injuries     ORTHOPEDIC SURGERY  2013    Knee     VASECTOMY         Social History     Tobacco Use     Smoking status: Never Smoker     Smokeless tobacco: Never Used     Tobacco comment: Lives with a smoker, smokes outside   Substance Use Topics     Alcohol use: No     Family History   Adopted: Yes   Problem Relation Age of Onset     Unknown/Adopted  Other         patient is adopted, no family hx,     Unknown/Adopted Mother      Coronary Artery Disease Mother         DVT death in 2017.     Hyperlipidemia Mother         High cholesterol     Irritable Bowel Syndrome Mother      Migraines Mother      Unknown/Adopted Maternal Grandmother      Unknown/Adopted Maternal Grandfather      Hyperlipidemia Maternal Grandfather         High cholesterol     Unknown/Adopted Paternal Grandmother      Unknown/Adopted Paternal Grandfather      Unknown/Adopted Brother      Unknown/Adopted Father      Unknown/Adopted Other      Cancer No family hx of      Glaucoma No family hx of      Macular Degeneration No family hx of      Diabetes No family hx of      Hypertension No family hx of      Cerebrovascular Disease No family hx of      Thyroid Disease No family hx of          Current Outpatient Medications   Medication Sig Dispense Refill     rizatriptan (MAXALT) 10 MG tablet TAKE 1 TABLET BY MOUTH AT ONSET OF HEADACHE FOR MIGRAINE. MAY REPEAT DOSE AFTER 2 HOURS AS NEEDED 30 tablet 11     Allergies   Allergen Reactions     Gluten Meal      Latex        Reviewed and updated as needed this visit by clinical staff   Tobacco  Allergies  Meds   Med Hx  Surg Hx  Fam Hx  Soc Hx          Reviewed and updated as needed this visit by Provider                   Past Medical History:   Diagnosis Date     Arthritis 1999    Neck     BCC (basal cell carcinoma), face 2009     High cholesterol      Hyperlipidemia      Other general counseling and advice for contraceptive management      Shoulder strain      Vascular headaches      Vascular headaches      Vascular headaches      Vascular headaches       Past Surgical History:   Procedure Laterality Date     ABDOMEN SURGERY  11/9/2015    CT Scan, found 1.6 CM homogeneous cyst on kidney     BACK SURGERY  [id number]    Neck injuries     BIOPSY       COLONOSCOPY  11/14/2015    [facility], no abnormalities     ENT SURGERY       GI SURGERY  2012, 10/2015     Gluten intolerant, IBD like symptoms     HEAD & NECK SURGERY  0178-2040    Neck injuries     ORTHOPEDIC SURGERY  2013    Knee     VASECTOMY         Review of Systems   Constitutional: Negative for chills and fever.   HENT: Negative for congestion, ear pain, hearing loss and sore throat.    Eyes: Negative for pain and visual disturbance.   Respiratory: Negative for cough and shortness of breath.    Cardiovascular: Negative for chest pain, palpitations and peripheral edema.   Gastrointestinal: Negative for abdominal pain, constipation, diarrhea, heartburn, hematochezia and nausea.   Genitourinary: Positive for impotence. Negative for dysuria, frequency, genital sores, hematuria, penile discharge and urgency.   Musculoskeletal: Positive for arthralgias. Negative for joint swelling and myalgias.   Skin: Negative for rash.   Neurological: Negative for dizziness, weakness, headaches and paresthesias.   Psychiatric/Behavioral: Negative for mood changes. The patient is not nervous/anxious.        OBJECTIVE:   /79   Pulse 69   Temp 97.2  F (36.2  C) (Tympanic)   Resp 16   Wt 92.1 kg (203 lb)   SpO2 97%   BMI 29.98 kg/m      Physical Exam  GENERAL: alert, no distress and over weight  EYES: Eyes grossly normal to inspection, PERRL and conjunctivae and sclerae normal  HENT: ear canals and TM's normal, nose and mouth without ulcers or lesions  NECK: no adenopathy, no asymmetry, masses, or scars and thyroid normal to palpation  RESP: lungs clear to auscultation - no rales, rhonchi or wheezes  CV: regular rate and rhythm, normal S1 S2, no S3 or S4, no murmur, click or rub, no peripheral edema and peripheral pulses strong  MS: no gross musculoskeletal defects noted, no edema  SKIN: no suspicious lesions or rashes  NEURO: Normal strength and tone, mentation intact and speech normal  PSYCH: mentation appears normal, affect normal/bright      ASSESSMENT/PLAN:       ICD-10-CM    1. Routine general medical examination at  "a health care facility  Z00.00    2. Vascular headaches  G44.1 rizatriptan (MAXALT) 10 MG tablet       Patient has been advised of split billing requirements and indicates understanding: Yes    COUNSELING:   Reviewed preventive health counseling, as reflected in patient instructions       Regular exercise       Healthy diet/nutrition       Vision screening       Hearing screening    Estimated body mass index is 29.98 kg/m  as calculated from the following:    Height as of 1/20/22: 1.753 m (5' 9\").    Weight as of this encounter: 92.1 kg (203 lb).     Weight management plan: Discussed healthy diet and exercise guidelines    He reports that he has never smoked. He has never used smokeless tobacco.      Counseling Resources:  ATP IV Guidelines  Pooled Cohorts Equation Calculator  FRAX Risk Assessment  ICSI Preventive Guidelines  Dietary Guidelines for Americans, 2010  USDA's MyPlate  ASA Prophylaxis  Lung CA Screening    Abbey Cuellar PA-C  [facility]  "

## 2022-07-07 ASSESSMENT — ENCOUNTER SYMPTOMS
HEARTBURN: 0
HEADACHES: 0
HEMATURIA: 0
FEVER: 0
NAUSEA: 0
DIZZINESS: 0
PARESTHESIAS: 0
JOINT SWELLING: 0
SHORTNESS OF BREATH: 0
DIARRHEA: 0
EYE PAIN: 0
CHILLS: 0
ARTHRALGIAS: 1
SORE THROAT: 0
FREQUENCY: 0
NERVOUS/ANXIOUS: 0
WEAKNESS: 0
DYSURIA: 0
PALPITATIONS: 0
CONSTIPATION: 0
MYALGIAS: 0
ABDOMINAL PAIN: 0
HEMATOCHEZIA: 0
COUGH: 0

## 2022-07-14 ENCOUNTER — OFFICE VISIT (OUTPATIENT)
Dept: FAMILY MEDICINE | Facility: CLINIC | Age: 52
End: 2022-07-14
Payer: COMMERCIAL

## 2022-07-14 VITALS
RESPIRATION RATE: 16 BRPM | DIASTOLIC BLOOD PRESSURE: 79 MMHG | TEMPERATURE: 97.2 F | SYSTOLIC BLOOD PRESSURE: 125 MMHG | OXYGEN SATURATION: 97 % | WEIGHT: 203 LBS | HEART RATE: 69 BPM | BODY MASS INDEX: 29.98 KG/M2

## 2022-07-14 DIAGNOSIS — Z00.00 ROUTINE GENERAL MEDICAL EXAMINATION AT A HEALTH CARE FACILITY: Primary | ICD-10-CM

## 2022-07-14 DIAGNOSIS — G44.1 VASCULAR HEADACHE: ICD-10-CM

## 2022-07-14 PROCEDURE — 99396 PREV VISIT EST AGE 40-64: CPT | Performed by: PHYSICIAN ASSISTANT

## 2022-07-14 RX ORDER — RIZATRIPTAN BENZOATE 10 MG/1
TABLET ORAL
Qty: 30 TABLET | Refills: 11 | Status: SHIPPED | OUTPATIENT
Start: 2022-07-14 | End: 2023-10-23

## 2022-07-14 ASSESSMENT — ENCOUNTER SYMPTOMS
HEARTBURN: 0
MYALGIAS: 0
DYSURIA: 0
HEMATURIA: 0
PARESTHESIAS: 0
FEVER: 0
CHILLS: 0
SORE THROAT: 0
DIZZINESS: 0
JOINT SWELLING: 0
ARTHRALGIAS: 1
NERVOUS/ANXIOUS: 0
CONSTIPATION: 0
WEAKNESS: 0
HEADACHES: 0
PALPITATIONS: 0
SHORTNESS OF BREATH: 0
DIARRHEA: 0
ABDOMINAL PAIN: 0
HEMATOCHEZIA: 0
NAUSEA: 0
FREQUENCY: 0
EYE PAIN: 0
COUGH: 0

## 2022-08-21 ENCOUNTER — HEALTH MAINTENANCE LETTER (OUTPATIENT)
Age: 52
End: 2022-08-21

## 2022-09-27 ENCOUNTER — E-VISIT (OUTPATIENT)
Dept: FAMILY MEDICINE | Facility: CLINIC | Age: 52
End: 2022-09-27
Payer: COMMERCIAL

## 2022-09-27 DIAGNOSIS — N52.8 OTHER MALE ERECTILE DYSFUNCTION: Primary | ICD-10-CM

## 2022-09-27 PROCEDURE — 99422 OL DIG E/M SVC 11-20 MIN: CPT | Performed by: PHYSICIAN ASSISTANT

## 2022-09-29 RX ORDER — SILDENAFIL 50 MG/1
50-100 TABLET, FILM COATED ORAL DAILY PRN
Qty: 10 TABLET | Refills: 11 | Status: SHIPPED | OUTPATIENT
Start: 2022-09-29 | End: 2023-10-23

## 2022-11-07 NOTE — PROGRESS NOTES
Conjuntivae and eyelids appear normal, Sclerae : White without injection Marcell Rodriguez has an upcoming lab appointment:    Future Appointments   Date Time Provider Department Center   6/25/2022 11:15 AM AN LAB [facility]   7/14/2022  7:30 AM [facility]ellar [facility]         There is no order available. Please review and place either future orders or HMPO (Review of Health Maintenance Protocol Orders), as appropriate.    Health Maintenance Due   Topic     ANNUAL REVIEW OF HM ORDERS      Pattie Villa

## 2022-12-26 ENCOUNTER — HEALTH MAINTENANCE LETTER (OUTPATIENT)
Age: 52
End: 2022-12-26

## 2023-08-21 ENCOUNTER — MYC MEDICAL ADVICE (OUTPATIENT)
Dept: FAMILY MEDICINE | Facility: CLINIC | Age: 53
End: 2023-08-21
Payer: COMMERCIAL

## 2023-08-21 DIAGNOSIS — Z13.1 SCREENING FOR DIABETES MELLITUS: Primary | ICD-10-CM

## 2023-08-21 DIAGNOSIS — Z13.220 SCREENING, LIPID: ICD-10-CM

## 2023-09-13 NOTE — PROGRESS NOTES
Needs fasting pre visit lab before 10/25 Please call patient to set up afasting lab appointment.

## 2023-09-13 NOTE — PROGRESS NOTES
Patient is already scheduled for a PVL 9/19/23.Ceci Allison [facility]

## 2023-09-17 ENCOUNTER — HEALTH MAINTENANCE LETTER (OUTPATIENT)
Age: 53
End: 2023-09-17

## 2023-09-19 ENCOUNTER — LAB (OUTPATIENT)
Dept: LAB | Facility: CLINIC | Age: 53
End: 2023-09-19
Payer: COMMERCIAL

## 2023-09-19 DIAGNOSIS — Z13.1 SCREENING FOR DIABETES MELLITUS: ICD-10-CM

## 2023-09-19 DIAGNOSIS — Z13.220 SCREENING, LIPID: ICD-10-CM

## 2023-09-19 LAB
ALBUMIN SERPL BCG-MCNC: 4 G/DL (ref 3.5–5.2)
ALP SERPL-CCNC: 44 U/L (ref 40–129)
ALT SERPL W P-5'-P-CCNC: 19 U/L (ref 0–70)
ANION GAP SERPL CALCULATED.3IONS-SCNC: 9 MMOL/L (ref 7–15)
AST SERPL W P-5'-P-CCNC: 22 U/L (ref 0–45)
BILIRUB SERPL-MCNC: 0.3 MG/DL
BUN SERPL-MCNC: 8.1 MG/DL (ref 6–20)
CALCIUM SERPL-MCNC: 9.2 MG/DL (ref 8.6–10)
CHLORIDE SERPL-SCNC: 104 MMOL/L (ref 98–107)
CHOLEST SERPL-MCNC: 212 MG/DL
CREAT SERPL-MCNC: 0.84 MG/DL (ref 0.67–1.17)
DEPRECATED HCO3 PLAS-SCNC: 26 MMOL/L (ref 22–29)
EGFRCR SERPLBLD CKD-EPI 2021: >90 ML/MIN/1.73M2
GLUCOSE SERPL-MCNC: 97 MG/DL (ref 70–99)
HDLC SERPL-MCNC: 63 MG/DL
LDLC SERPL CALC-MCNC: 122 MG/DL
NONHDLC SERPL-MCNC: 149 MG/DL
POTASSIUM SERPL-SCNC: 4.2 MMOL/L (ref 3.4–5.3)
PROT SERPL-MCNC: 6.3 G/DL (ref 6.4–8.3)
SODIUM SERPL-SCNC: 139 MMOL/L (ref 136–145)
TRIGL SERPL-MCNC: 134 MG/DL

## 2023-09-19 PROCEDURE — 36415 COLL VENOUS BLD VENIPUNCTURE: CPT

## 2023-09-19 PROCEDURE — 80061 LIPID PANEL: CPT

## 2023-09-19 PROCEDURE — 80053 COMPREHEN METABOLIC PANEL: CPT

## 2023-10-18 ASSESSMENT — ENCOUNTER SYMPTOMS
SHORTNESS OF BREATH: 0
HEMATOCHEZIA: 0
HEARTBURN: 0
ABDOMINAL PAIN: 0
SORE THROAT: 0
MYALGIAS: 0
FREQUENCY: 0
PALPITATIONS: 0
HEADACHES: 0
JOINT SWELLING: 0
CHILLS: 0
DYSURIA: 0
FEVER: 0
ARTHRALGIAS: 0
PARESTHESIAS: 0
COUGH: 0
NAUSEA: 0
CONSTIPATION: 0
DIZZINESS: 0
DIARRHEA: 0
WEAKNESS: 0
HEMATURIA: 0
EYE PAIN: 0
NERVOUS/ANXIOUS: 0

## 2023-10-25 ENCOUNTER — OFFICE VISIT (OUTPATIENT)
Dept: FAMILY MEDICINE | Facility: CLINIC | Age: 53
End: 2023-10-25
Payer: COMMERCIAL

## 2023-10-25 VITALS
DIASTOLIC BLOOD PRESSURE: 82 MMHG | HEIGHT: 69 IN | BODY MASS INDEX: 23.25 KG/M2 | SYSTOLIC BLOOD PRESSURE: 126 MMHG | WEIGHT: 157 LBS | RESPIRATION RATE: 16 BRPM | OXYGEN SATURATION: 99 % | HEART RATE: 58 BPM | TEMPERATURE: 97.6 F

## 2023-10-25 DIAGNOSIS — N52.8 OTHER MALE ERECTILE DYSFUNCTION: ICD-10-CM

## 2023-10-25 DIAGNOSIS — Z00.00 ROUTINE GENERAL MEDICAL EXAMINATION AT A HEALTH CARE FACILITY: Primary | ICD-10-CM

## 2023-10-25 DIAGNOSIS — G44.1 VASCULAR HEADACHE: ICD-10-CM

## 2023-10-25 PROCEDURE — 99396 PREV VISIT EST AGE 40-64: CPT | Performed by: FAMILY MEDICINE

## 2023-10-25 PROCEDURE — 99213 OFFICE O/P EST LOW 20 MIN: CPT | Mod: 25 | Performed by: FAMILY MEDICINE

## 2023-10-25 RX ORDER — RIZATRIPTAN BENZOATE 10 MG/1
TABLET ORAL
Qty: 30 TABLET | Refills: 11 | Status: SHIPPED | OUTPATIENT
Start: 2023-10-25

## 2023-10-25 RX ORDER — SILDENAFIL 50 MG/1
50-100 TABLET, FILM COATED ORAL DAILY PRN
Qty: 10 TABLET | Refills: 11 | Status: SHIPPED | OUTPATIENT
Start: 2023-10-25

## 2023-10-25 ASSESSMENT — ENCOUNTER SYMPTOMS
DIARRHEA: 0
PALPITATIONS: 0
ARTHRALGIAS: 0
PARESTHESIAS: 0
ABDOMINAL PAIN: 0
SORE THROAT: 0
COUGH: 0
HEARTBURN: 0
FREQUENCY: 0
JOINT SWELLING: 0
FEVER: 0
WEAKNESS: 0
HEADACHES: 0
MYALGIAS: 0
HEMATOCHEZIA: 0
NERVOUS/ANXIOUS: 0
EYE PAIN: 0
HEMATURIA: 0
SHORTNESS OF BREATH: 0
NAUSEA: 0
CONSTIPATION: 0
DYSURIA: 0
DIZZINESS: 0
CHILLS: 0

## 2023-10-25 NOTE — PROGRESS NOTES
SUBJECTIVE:   CC: Robert is an 52 year old who presents for preventative health visit.       10/25/2023     8:09 AM   Additional Questions   Roomed by Francisco MIRANDA LPN   Accompanied by Self       Healthy Habits:     Getting at least 3 servings of Calcium per day:  Yes    Bi-annual eye exam:  Yes    Dental care twice a year:  Yes    Sleep apnea or symptoms of sleep apnea:  None    Diet:  Breakfast skipped    Frequency of exercise:  2-3 days/week    Duration of exercise:  15-30 minutes    Taking medications regularly:  Yes    Medication side effects:  Not applicable    Additional concerns today:  No      Today's PHQ-2 Score:       10/25/2023     8:21 AM   PHQ-2 ( 1999 Pfizer)   Q1: Little interest or pleasure in doing things 0   Q2: Feeling down, depressed or hopeless 0   PHQ-2 Score 0   Q1: Little interest or pleasure in doing things Not at all   Q2: Feeling down, depressed or hopeless Not at all   PHQ-2 Score 0       Headaches are controlled with maxalt and no side effects      Social History     Tobacco Use    Smoking status: Never     Passive exposure: Never    Smokeless tobacco: Never    Tobacco comments:     Lives with a smoker, smokes outside   Substance Use Topics    Alcohol use: No             10/18/2023     9:04 AM   Alcohol Use   Prescreen: >3 drinks/day or >7 drinks/week? Not Applicable       Last PSA:   PSA   Date Value Ref Range Status   05/15/2021 1.08 0 - 4 ug/L Final     Comment:     Assay Method:  Chemiluminescence using Siemens Vista analyzer     Prostate Specific Antigen Screen   Date Value Ref Range Status   06/25/2022 0.84 0.00 - 4.00 ug/L Final       Reviewed orders with patient. Reviewed health maintenance and updated orders accordingly - Yes       Reviewed and updated as needed this visit by clinical staff   Tobacco  Allergies  Meds              Reviewed and updated as needed this visit by Provider                 Past Medical History:   Diagnosis Date    Arthritis 1999    Neck    BCC (basal cell  "carcinoma), face 2009    High cholesterol     Hyperlipidemia     Other general counseling and advice for contraceptive management     Shoulder strain     Vascular headaches     Vascular headaches     Vascular headaches     Vascular headaches         Review of Systems   Constitutional:  Negative for chills and fever.   HENT:  Negative for congestion, ear pain, hearing loss and sore throat.    Eyes:  Negative for pain and visual disturbance.   Respiratory:  Negative for cough and shortness of breath.    Cardiovascular:  Negative for chest pain, palpitations and peripheral edema.   Gastrointestinal:  Negative for abdominal pain, constipation, diarrhea, heartburn, hematochezia and nausea.   Genitourinary:  Negative for dysuria, frequency, genital sores, hematuria, impotence, penile discharge and urgency.   Musculoskeletal:  Negative for arthralgias, joint swelling and myalgias.   Skin:  Negative for rash.   Neurological:  Negative for dizziness, weakness, headaches and paresthesias.   Psychiatric/Behavioral:  Negative for mood changes. The patient is not nervous/anxious.      CONSTITUTIONAL: NEGATIVE for fever, chills, change in weight  INTEGUMENTARY/SKIN: NEGATIVE for worrisome rashes, moles or lesions  EYES: NEGATIVE for vision changes or irritation  ENT: NEGATIVE for ear, mouth and throat problems  RESP: NEGATIVE for significant cough or SOB  CV: NEGATIVE for chest pain, palpitations or peripheral edema  GI: NEGATIVE for nausea, abdominal pain, heartburn, or change in bowel habits   male: negative for dysuria, hematuria, decreased urinary stream, erectile dysfunction, urethral discharge  MUSCULOSKELETAL: NEGATIVE for significant arthralgias or myalgia  NEURO: NEGATIVE for weakness, dizziness or paresthesias  PSYCHIATRIC: NEGATIVE for changes in mood or affect    OBJECTIVE:   /82   Pulse 58   Temp 97.6  F (36.4  C) (Tympanic)   Resp 16   Ht 1.746 m (5' 8.75\")   Wt 71.2 kg (157 lb)   SpO2 99%   BMI 23.35 " kg/m      Physical Exam  GENERAL: healthy, alert and no distress  EYES: Eyes grossly normal to inspection, PERRL and conjunctivae and sclerae normal  HENT: ear canals and TM's normal, nose and mouth without ulcers or lesions  NECK: no adenopathy, no asymmetry, masses, or scars and thyroid normal to palpation  RESP: lungs clear to auscultation - no rales, rhonchi or wheezes  CV: regular rate and rhythm, normal S1 S2, no S3 or S4, no murmur, click or rub, no peripheral edema and peripheral pulses strong  ABDOMEN: soft, nontender, no hepatosplenomegaly, no masses and bowel sounds normal  MS: no gross musculoskeletal defects noted, no edema  SKIN: no suspicious lesions or rashes  NEURO: Normal strength and tone, mentation intact and speech normal  PSYCH: mentation appears normal, affect normal/bright    Diagnostic Test Results:  Labs reviewed in Epic       ASSESSMENT/PLAN:   (Z00.00) Routine general medical examination at a health care facility  (primary encounter diagnosis)    (G44.1) Vascular headaches  Plan: rizatriptan (MAXALT) 10 MG tablet            (N52.8) Other male erectile dysfunction    Plan: sildenafil (VIAGRA) 50 MG tablet  Follow up in 1 year     Patient has been advised of split billing requirements and indicates understanding: Yes      COUNSELING:   Reviewed preventive health counseling, as reflected in patient instructions            The 10-year ASCVD risk score (Eloisa KOENIG, et al., 2019) is: 4.3%    Values used to calculate the score:      Age: 52 years      Sex: Male      Is Non- : No      Diabetic: No      Tobacco smoker: No      Systolic Blood Pressure: 126 mmHg      Is BP treated: No      HDL Cholesterol: 63 mg/dL      Total Cholesterol: 212 mg/dL      He reports that he has never smoked. He has never been exposed to tobacco smoke. He has never used smokeless tobacco.            Gerald Mckeon MD  [facility]

## 2024-04-25 ENCOUNTER — MYC MEDICAL ADVICE (OUTPATIENT)
Dept: FAMILY MEDICINE | Facility: CLINIC | Age: 54
End: 2024-04-25
Payer: COMMERCIAL

## 2024-05-15 ENCOUNTER — OFFICE VISIT (OUTPATIENT)
Dept: FAMILY MEDICINE | Facility: CLINIC | Age: 54
End: 2024-05-15
Payer: COMMERCIAL

## 2024-05-15 VITALS
HEIGHT: 69 IN | TEMPERATURE: 97.1 F | OXYGEN SATURATION: 99 % | RESPIRATION RATE: 16 BRPM | BODY MASS INDEX: 23.28 KG/M2 | DIASTOLIC BLOOD PRESSURE: 87 MMHG | WEIGHT: 157.2 LBS | SYSTOLIC BLOOD PRESSURE: 126 MMHG | HEART RATE: 57 BPM

## 2024-05-15 DIAGNOSIS — R53.82 CHRONIC FATIGUE: Primary | ICD-10-CM

## 2024-05-15 PROCEDURE — 99214 OFFICE O/P EST MOD 30 MIN: CPT | Performed by: FAMILY MEDICINE

## 2024-05-15 ASSESSMENT — PAIN SCALES - GENERAL: PAINLEVEL: NO PAIN (0)

## 2024-05-15 NOTE — PROGRESS NOTES
ICD-10-CM    1. Chronic fatigue  R53.82 Testosterone, total     TSH with free T4 reflex     CBC with platelets and differential       PLAN:await blood work     Subjective   Robert is a 53 year old, presenting for the following health issues:  Lab Only        5/15/2024    11:31 AM   Additional Questions   Roomed by Linda   Accompanied by YOSELIN       Via the Health Maintenance questionnaire, the patient has reported the following services have been completed -Eye Exam: [employer] [facility] 2023-05-08, this information has been sent to the abstraction team.  History of Present Illness       Reason for visit:  Consult  Symptom onset:  More than a month  Symptom intensity:  Mild  Symptom progression:  Worsening  Had these symptoms before:  Yes    He eats 4 or more servings of fruits and vegetables daily.He consumes 0 sweetened beverage(s) daily.He exercises with enough effort to increase his heart rate 30 to 60 minutes per day.  He exercises with enough effort to increase his heart rate 5 days per week.   He is taking medications regularly.       SUBJECTIVE:  53 year old.The patient has a complaint of feeling tired and weak..  This started 1-2 years ago. quality joint pain, mostly outsides  Associated symptoms are muscle fatigue.  Brought on by aging . . ROS weight stable, time of running are the same. Bi chest pain and shortness of breath       Reviewed health maintenance  Patient Active Problem List   Diagnosis    Vascular headaches    High cholesterol    Encounter for other general counseling or advice on contraception    Shoulder strain    Hyperlipidemia    Hyperlipidemia LDL goal <130    GERD (gastroesophageal reflux disease)    Irritable bowel syndrome with diarrhea    BCC (basal cell carcinoma), face     Past Medical History:   Diagnosis Date    Arthritis 1999    Neck    BCC (basal cell carcinoma), face 2009    High cholesterol     Hyperlipidemia     Other general counseling and advice for contraceptive management      "Shoulder strain     Vascular headaches     Vascular headaches     Vascular headaches     Vascular headaches    The 10-year ASCVD risk score (Eloisa KOENIG, et al., 2019) is: 4.8%    Values used to calculate the score:      Age: 53 years      Sex: Male      Is Non- : No      Diabetic: No      Tobacco smoker: No      Systolic Blood Pressure: 126 mmHg      Is BP treated: No      HDL Cholesterol: 63 mg/dL      Total Cholesterol: 212 mg/dL      OBJECTIVE:  no apparent distress  /87   Pulse 57   Temp 97.1  F (36.2  C) (Tympanic)   Resp 16   Ht 1.746 m (5' 8.74\")   Wt 71.3 kg (157 lb 3.2 oz)   SpO2 99%   BMI 23.39 kg/m      LUNGS:  CTA B/L, no wheezing or crackles.   Cardiovascular: negative, PMI normal. No lifts, heaves, or thrills. RRR. No murmurs, clicks gallops or rub   Gastrointestinal: Abdomen soft, non-tender. BS normal. No masses, organomegaly         Signed Electronically by: Gerald Mckeon MD    "

## 2024-05-17 ENCOUNTER — LAB (OUTPATIENT)
Dept: LAB | Facility: CLINIC | Age: 54
End: 2024-05-17
Payer: COMMERCIAL

## 2024-05-17 DIAGNOSIS — R53.82 CHRONIC FATIGUE: ICD-10-CM

## 2024-05-17 LAB
BASOPHILS # BLD AUTO: 0 10E3/UL (ref 0–0.2)
BASOPHILS NFR BLD AUTO: 1 %
EOSINOPHIL # BLD AUTO: 0.2 10E3/UL (ref 0–0.7)
EOSINOPHIL NFR BLD AUTO: 4 %
ERYTHROCYTE [DISTWIDTH] IN BLOOD BY AUTOMATED COUNT: 12.5 % (ref 10–15)
HCT VFR BLD AUTO: 41.9 % (ref 40–53)
HGB BLD-MCNC: 14.2 G/DL (ref 13.3–17.7)
IMM GRANULOCYTES # BLD: 0 10E3/UL
IMM GRANULOCYTES NFR BLD: 0 %
LYMPHOCYTES # BLD AUTO: 1.2 10E3/UL (ref 0.8–5.3)
LYMPHOCYTES NFR BLD AUTO: 31 %
MCH RBC QN AUTO: 29.9 PG (ref 26.5–33)
MCHC RBC AUTO-ENTMCNC: 33.9 G/DL (ref 31.5–36.5)
MCV RBC AUTO: 88 FL (ref 78–100)
MONOCYTES # BLD AUTO: 0.4 10E3/UL (ref 0–1.3)
MONOCYTES NFR BLD AUTO: 12 %
NEUTROPHILS # BLD AUTO: 2 10E3/UL (ref 1.6–8.3)
NEUTROPHILS NFR BLD AUTO: 52 %
PLATELET # BLD AUTO: 171 10E3/UL (ref 150–450)
RBC # BLD AUTO: 4.75 10E6/UL (ref 4.4–5.9)
TSH SERPL DL<=0.005 MIU/L-ACNC: 1.03 UIU/ML (ref 0.3–4.2)
WBC # BLD AUTO: 3.8 10E3/UL (ref 4–11)

## 2024-05-17 PROCEDURE — 36415 COLL VENOUS BLD VENIPUNCTURE: CPT

## 2024-05-17 PROCEDURE — 84403 ASSAY OF TOTAL TESTOSTERONE: CPT

## 2024-05-17 PROCEDURE — 84443 ASSAY THYROID STIM HORMONE: CPT

## 2024-05-17 PROCEDURE — 85025 COMPLETE CBC W/AUTO DIFF WBC: CPT

## 2024-05-21 LAB — TESTOST SERPL-MCNC: 454 NG/DL (ref 240–950)

## 2024-08-20 ENCOUNTER — MEDICAL CORRESPONDENCE (OUTPATIENT)
Dept: HEALTH INFORMATION MANAGEMENT | Facility: CLINIC | Age: 54
End: 2024-08-20
Payer: COMMERCIAL

## 2025-01-04 ENCOUNTER — HEALTH MAINTENANCE LETTER (OUTPATIENT)
Age: 55
End: 2025-01-04